# Patient Record
Sex: FEMALE | Race: WHITE | NOT HISPANIC OR LATINO | Employment: OTHER | URBAN - METROPOLITAN AREA
[De-identification: names, ages, dates, MRNs, and addresses within clinical notes are randomized per-mention and may not be internally consistent; named-entity substitution may affect disease eponyms.]

---

## 2019-08-14 ENCOUNTER — HISTORICAL (OUTPATIENT)
Dept: ADMINISTRATIVE | Facility: HOSPITAL | Age: 74
End: 2019-08-14

## 2019-08-15 ENCOUNTER — HISTORICAL (OUTPATIENT)
Dept: ADMINISTRATIVE | Facility: HOSPITAL | Age: 74
End: 2019-08-15

## 2019-09-25 ENCOUNTER — HISTORICAL (OUTPATIENT)
Dept: ADMINISTRATIVE | Facility: HOSPITAL | Age: 74
End: 2019-09-25

## 2019-12-17 ENCOUNTER — HISTORICAL (OUTPATIENT)
Dept: RADIOLOGY | Facility: HOSPITAL | Age: 74
End: 2019-12-17

## 2020-02-03 ENCOUNTER — HISTORICAL (OUTPATIENT)
Dept: ADMINISTRATIVE | Facility: HOSPITAL | Age: 75
End: 2020-02-03

## 2020-02-03 LAB
ALBUMIN SERPL-MCNC: 3.7 GM/DL (ref 3.4–5)
ALBUMIN/GLOB SERPL: 1.2 {RATIO}
ALP SERPL-CCNC: 61 UNIT/L (ref 38–126)
ALT SERPL-CCNC: 26 UNIT/L (ref 12–78)
AST SERPL-CCNC: 19 UNIT/L (ref 15–37)
BILIRUB SERPL-MCNC: 0.7 MG/DL (ref 0.2–1)
BILIRUBIN DIRECT+TOT PNL SERPL-MCNC: 0.2 MG/DL (ref 0–0.2)
BILIRUBIN DIRECT+TOT PNL SERPL-MCNC: 0.5 MG/DL (ref 0–0.8)
BUN SERPL-MCNC: 17 MG/DL (ref 7–18)
CALCIUM SERPL-MCNC: 9.2 MG/DL (ref 8.5–10.1)
CHLORIDE SERPL-SCNC: 106 MMOL/L (ref 98–107)
CO2 SERPL-SCNC: 30 MMOL/L (ref 21–32)
CREAT SERPL-MCNC: 0.92 MG/DL (ref 0.55–1.02)
ERYTHROCYTE [DISTWIDTH] IN BLOOD BY AUTOMATED COUNT: 13.7 % (ref 11.5–17)
GLOBULIN SER-MCNC: 3.1 GM/DL (ref 2.4–3.5)
GLUCOSE SERPL-MCNC: 118 MG/DL (ref 74–106)
HCT VFR BLD AUTO: 39.1 % (ref 37–47)
HGB BLD-MCNC: 12 GM/DL (ref 12–16)
MCH RBC QN AUTO: 26.3 PG (ref 27–31)
MCHC RBC AUTO-ENTMCNC: 30.7 GM/DL (ref 33–36)
MCV RBC AUTO: 85.6 FL (ref 80–94)
PLATELET # BLD AUTO: 206 X10(3)/MCL (ref 130–400)
PMV BLD AUTO: 9.9 FL (ref 9.4–12.4)
POTASSIUM SERPL-SCNC: 5.1 MMOL/L (ref 3.5–5.1)
PROT SERPL-MCNC: 6.8 GM/DL (ref 6.4–8.2)
RBC # BLD AUTO: 4.57 X10(6)/MCL (ref 4.2–5.4)
SODIUM SERPL-SCNC: 138 MMOL/L (ref 136–145)
WBC # SPEC AUTO: 6.4 X10(3)/MCL (ref 4.5–11.5)

## 2020-02-04 ENCOUNTER — HISTORICAL (OUTPATIENT)
Dept: PREADMISSION TESTING | Facility: HOSPITAL | Age: 75
End: 2020-02-04

## 2020-02-13 ENCOUNTER — HISTORICAL (OUTPATIENT)
Dept: SURGERY | Facility: HOSPITAL | Age: 75
End: 2020-02-13

## 2020-05-20 ENCOUNTER — HISTORICAL (OUTPATIENT)
Dept: RADIOLOGY | Facility: HOSPITAL | Age: 75
End: 2020-05-20

## 2021-03-03 ENCOUNTER — HISTORICAL (OUTPATIENT)
Dept: RADIOLOGY | Facility: HOSPITAL | Age: 76
End: 2021-03-03

## 2021-03-08 ENCOUNTER — HISTORICAL (OUTPATIENT)
Dept: ADMINISTRATIVE | Facility: HOSPITAL | Age: 76
End: 2021-03-08

## 2021-03-11 ENCOUNTER — HISTORICAL (OUTPATIENT)
Dept: RESPIRATORY THERAPY | Facility: HOSPITAL | Age: 76
End: 2021-03-11

## 2021-07-20 ENCOUNTER — HISTORICAL (OUTPATIENT)
Dept: RADIOLOGY | Facility: HOSPITAL | Age: 76
End: 2021-07-20

## 2021-07-20 LAB — POC CREATININE: 1 MG/DL (ref 0.6–1.3)

## 2021-07-22 ENCOUNTER — HISTORICAL (OUTPATIENT)
Dept: ADMINISTRATIVE | Facility: HOSPITAL | Age: 76
End: 2021-07-22

## 2021-07-22 LAB
ABS NEUT (OLG): 4.3 X10(3)/MCL (ref 2.1–9.2)
ALBUMIN SERPL-MCNC: 3.4 GM/DL (ref 3.4–4.8)
ALBUMIN/GLOB SERPL: 1 RATIO (ref 1.1–2)
ALP SERPL-CCNC: 75 UNIT/L (ref 40–150)
ALT SERPL-CCNC: 20 UNIT/L (ref 0–55)
AST SERPL-CCNC: 25 UNIT/L (ref 5–34)
BASOPHILS # BLD AUTO: 0 X10(3)/MCL (ref 0–0.2)
BASOPHILS NFR BLD AUTO: 0.7 %
BILIRUB SERPL-MCNC: 0.6 MG/DL
BILIRUBIN DIRECT+TOT PNL SERPL-MCNC: 0.2 MG/DL (ref 0–0.5)
BILIRUBIN DIRECT+TOT PNL SERPL-MCNC: 0.4 MG/DL (ref 0–0.8)
BUN SERPL-MCNC: 12.8 MG/DL (ref 9.8–20.1)
CALCIUM SERPL-MCNC: 8.7 MG/DL (ref 8.4–10.2)
CHLORIDE SERPL-SCNC: 106 MMOL/L (ref 98–107)
CO2 SERPL-SCNC: 29 MMOL/L (ref 23–31)
CREAT SERPL-MCNC: 1.04 MG/DL (ref 0.55–1.02)
EOSINOPHIL # BLD AUTO: 0.2 X10(3)/MCL (ref 0–0.9)
EOSINOPHIL NFR BLD AUTO: 2.7 %
ERYTHROCYTE [DISTWIDTH] IN BLOOD BY AUTOMATED COUNT: 13.9 % (ref 11.5–17)
GLOBULIN SER-MCNC: 3.5 GM/DL (ref 2.4–3.5)
GLUCOSE SERPL-MCNC: 103 MG/DL (ref 82–115)
HCT VFR BLD AUTO: 38.9 % (ref 37–47)
HGB BLD-MCNC: 12.2 GM/DL (ref 12–16)
LYMPHOCYTES # BLD AUTO: 2.1 X10(3)/MCL (ref 0.6–4.6)
LYMPHOCYTES NFR BLD AUTO: 28.3 %
MCH RBC QN AUTO: 26.1 PG (ref 27–31)
MCHC RBC AUTO-ENTMCNC: 31.4 GM/DL (ref 33–36)
MCV RBC AUTO: 83.1 FL (ref 80–94)
MONOCYTES # BLD AUTO: 0.7 X10(3)/MCL (ref 0.1–1.3)
MONOCYTES NFR BLD AUTO: 9.4 %
NEUTROPHILS # BLD AUTO: 4.3 X10(3)/MCL (ref 2.1–9.2)
NEUTROPHILS NFR BLD AUTO: 58.6 %
PLATELET # BLD AUTO: 223 X10(3)/MCL (ref 130–400)
PMV BLD AUTO: 10 FL (ref 9.4–12.4)
POTASSIUM SERPL-SCNC: 4.8 MMOL/L (ref 3.5–5.1)
PROT SERPL-MCNC: 6.9 GM/DL (ref 5.8–7.6)
RBC # BLD AUTO: 4.68 X10(6)/MCL (ref 4.2–5.4)
SODIUM SERPL-SCNC: 143 MMOL/L (ref 136–145)
WBC # SPEC AUTO: 7.3 X10(3)/MCL (ref 4.5–11.5)

## 2021-10-26 ENCOUNTER — HISTORICAL (OUTPATIENT)
Dept: ADMINISTRATIVE | Facility: HOSPITAL | Age: 76
End: 2021-10-26

## 2021-10-26 LAB
ABS NEUT (OLG): 5.83 X10(3)/MCL (ref 2.1–9.2)
ALBUMIN SERPL-MCNC: 3.7 GM/DL (ref 3.4–4.8)
ALBUMIN/GLOB SERPL: 1.2 RATIO (ref 1.1–2)
ALP SERPL-CCNC: 76 UNIT/L (ref 40–150)
ALT SERPL-CCNC: 19 UNIT/L (ref 0–55)
AST SERPL-CCNC: 25 UNIT/L (ref 5–34)
BASOPHILS # BLD AUTO: 0 X10(3)/MCL (ref 0–0.2)
BASOPHILS NFR BLD AUTO: 0.5 %
BILIRUB SERPL-MCNC: 0.7 MG/DL
BILIRUBIN DIRECT+TOT PNL SERPL-MCNC: 0.3 MG/DL (ref 0–0.5)
BILIRUBIN DIRECT+TOT PNL SERPL-MCNC: 0.4 MG/DL (ref 0–0.8)
BUN SERPL-MCNC: 14.6 MG/DL (ref 9.8–20.1)
CALCIUM SERPL-MCNC: 9.3 MG/DL (ref 8.7–10.5)
CHLORIDE SERPL-SCNC: 106 MMOL/L (ref 98–107)
CO2 SERPL-SCNC: 29 MMOL/L (ref 23–31)
CREAT SERPL-MCNC: 0.89 MG/DL (ref 0.55–1.02)
EOSINOPHIL # BLD AUTO: 0.2 X10(3)/MCL (ref 0–0.9)
EOSINOPHIL NFR BLD AUTO: 2 %
ERYTHROCYTE [DISTWIDTH] IN BLOOD BY AUTOMATED COUNT: 13.5 % (ref 11.5–17)
GLOBULIN SER-MCNC: 3.2 GM/DL (ref 2.4–3.5)
GLUCOSE SERPL-MCNC: 90 MG/DL (ref 82–115)
HCT VFR BLD AUTO: 41.7 % (ref 37–47)
HGB BLD-MCNC: 12.8 GM/DL (ref 12–16)
LYMPHOCYTES # BLD AUTO: 2.2 X10(3)/MCL (ref 0.6–4.6)
LYMPHOCYTES NFR BLD AUTO: 23.9 %
MCH RBC QN AUTO: 26 PG (ref 27–31)
MCHC RBC AUTO-ENTMCNC: 30.7 GM/DL (ref 33–36)
MCV RBC AUTO: 84.6 FL (ref 80–94)
MONOCYTES # BLD AUTO: 0.9 X10(3)/MCL (ref 0.1–1.3)
MONOCYTES NFR BLD AUTO: 9.9 %
NEUTROPHILS # BLD AUTO: 5.8 X10(3)/MCL (ref 2.1–9.2)
NEUTROPHILS NFR BLD AUTO: 63.2 %
PLATELET # BLD AUTO: 214 X10(3)/MCL (ref 130–400)
PMV BLD AUTO: 9.2 FL (ref 9.4–12.4)
POTASSIUM SERPL-SCNC: 4.4 MMOL/L (ref 3.5–5.1)
PROT SERPL-MCNC: 6.9 GM/DL (ref 5.8–7.6)
RBC # BLD AUTO: 4.93 X10(6)/MCL (ref 4.2–5.4)
SODIUM SERPL-SCNC: 144 MMOL/L (ref 136–145)
WBC # SPEC AUTO: 9.2 X10(3)/MCL (ref 4.5–11.5)

## 2022-01-27 ENCOUNTER — HISTORICAL (OUTPATIENT)
Dept: HEMATOLOGY/ONCOLOGY | Facility: CLINIC | Age: 77
End: 2022-01-27

## 2022-01-27 LAB — POC CREATININE: 1 (ref 0.6–1.3)

## 2022-02-01 ENCOUNTER — HISTORICAL (OUTPATIENT)
Dept: ADMINISTRATIVE | Facility: HOSPITAL | Age: 77
End: 2022-02-01

## 2022-02-01 LAB
ABS NEUT (OLG): 5.52 (ref 2.1–9.2)
ALBUMIN SERPL-MCNC: 3.7 G/DL (ref 3.4–4.8)
ALBUMIN/GLOB SERPL: 1.2 {RATIO} (ref 1.1–2)
ALP SERPL-CCNC: 76 U/L (ref 40–150)
ALT SERPL-CCNC: 24 U/L (ref 0–55)
AST SERPL-CCNC: 23 U/L (ref 5–34)
BASOPHILS # BLD AUTO: 0 10*3/UL (ref 0–0.2)
BASOPHILS NFR BLD AUTO: 0.5 %
BILIRUB SERPL-MCNC: 0.7 MG/DL
BILIRUBIN DIRECT+TOT PNL SERPL-MCNC: 0.3 (ref 0–0.5)
BILIRUBIN DIRECT+TOT PNL SERPL-MCNC: 0.4 (ref 0–0.8)
BUN SERPL-MCNC: 15.7 MG/DL (ref 9.8–20.1)
CALCIUM SERPL-MCNC: 9.4 MG/DL (ref 8.7–10.5)
CHLORIDE SERPL-SCNC: 106 MMOL/L (ref 98–107)
CO2 SERPL-SCNC: 28 MMOL/L (ref 23–31)
CREAT SERPL-MCNC: 0.99 MG/DL (ref 0.55–1.02)
EOSINOPHIL # BLD AUTO: 0.1 10*3/UL (ref 0–0.9)
EOSINOPHIL NFR BLD AUTO: 1.5 %
ERYTHROCYTE [DISTWIDTH] IN BLOOD BY AUTOMATED COUNT: 13.2 % (ref 11.5–17)
GLOBULIN SER-MCNC: 3.2 G/DL (ref 2.4–3.5)
GLUCOSE SERPL-MCNC: 175 MG/DL (ref 82–115)
HCT VFR BLD AUTO: 41.3 % (ref 37–47)
HEMOLYSIS INTERF INDEX SERPL-ACNC: 6
HGB BLD-MCNC: 12.8 G/DL (ref 12–16)
ICTERIC INTERF INDEX SERPL-ACNC: 1
LDH SERPL-CCNC: 177 U/L (ref 140–271)
LIPEMIC INTERF INDEX SERPL-ACNC: 4
LYMPHOCYTES # BLD AUTO: 1.7 10*3/UL (ref 0.6–4.6)
LYMPHOCYTES NFR BLD AUTO: 21.6 %
MANUAL DIFF? (OHS): NO
MCH RBC QN AUTO: 26.1 PG (ref 27–31)
MCHC RBC AUTO-ENTMCNC: 31 G/DL (ref 33–36)
MCV RBC AUTO: 84.1 FL (ref 80–94)
MONOCYTES # BLD AUTO: 0.5 10*3/UL (ref 0.1–1.3)
MONOCYTES NFR BLD AUTO: 6.7 %
NEUTROPHILS # BLD AUTO: 5.5 10*3/UL (ref 2.1–9.2)
NEUTROPHILS NFR BLD AUTO: 69.3 %
PLATELET # BLD AUTO: 239 10*3/UL (ref 130–400)
PMV BLD AUTO: 9.8 FL (ref 9.4–12.4)
POTASSIUM SERPL-SCNC: 4.1 MMOL/L (ref 3.5–5.1)
PROT SERPL-MCNC: 6.9 G/DL (ref 5.8–7.6)
RBC # BLD AUTO: 4.91 10*6/UL (ref 4.2–5.4)
SODIUM SERPL-SCNC: 141 MMOL/L (ref 136–145)
WBC # SPEC AUTO: 8 10*3/UL (ref 4.5–11.5)

## 2022-04-11 ENCOUNTER — HISTORICAL (OUTPATIENT)
Dept: ADMINISTRATIVE | Facility: HOSPITAL | Age: 77
End: 2022-04-11
Payer: MEDICARE

## 2022-04-27 VITALS
SYSTOLIC BLOOD PRESSURE: 117 MMHG | BODY MASS INDEX: 29.82 KG/M2 | HEIGHT: 62 IN | WEIGHT: 162.06 LBS | OXYGEN SATURATION: 98 % | DIASTOLIC BLOOD PRESSURE: 78 MMHG

## 2022-04-27 DIAGNOSIS — C34.31 MALIGNANT NEOPLASM OF LOWER LOBE, RIGHT BRONCHUS OR LUNG: Primary | ICD-10-CM

## 2022-05-26 ENCOUNTER — OFFICE VISIT (OUTPATIENT)
Dept: HEMATOLOGY/ONCOLOGY | Facility: CLINIC | Age: 77
End: 2022-05-26
Payer: MEDICARE

## 2022-05-26 ENCOUNTER — LAB VISIT (OUTPATIENT)
Dept: LAB | Facility: HOSPITAL | Age: 77
End: 2022-05-26
Payer: MEDICARE

## 2022-05-26 VITALS
DIASTOLIC BLOOD PRESSURE: 80 MMHG | HEART RATE: 69 BPM | SYSTOLIC BLOOD PRESSURE: 134 MMHG | TEMPERATURE: 98 F | OXYGEN SATURATION: 97 % | BODY MASS INDEX: 30.55 KG/M2 | HEIGHT: 62 IN | WEIGHT: 166 LBS

## 2022-05-26 DIAGNOSIS — C34.90 MALIGNANT NEOPLASM OF UNSPECIFIED PART OF UNSPECIFIED BRONCHUS OR LUNG: ICD-10-CM

## 2022-05-26 DIAGNOSIS — C34.31 MALIGNANT NEOPLASM OF LOWER LOBE, RIGHT BRONCHUS OR LUNG: ICD-10-CM

## 2022-05-26 DIAGNOSIS — C34.31 SQUAMOUS CELL CARCINOMA OF BRONCHUS IN RIGHT LOWER LOBE: Primary | ICD-10-CM

## 2022-05-26 LAB
ALBUMIN SERPL-MCNC: 4 GM/DL (ref 3.4–4.8)
ALBUMIN/GLOB SERPL: 1.2 RATIO (ref 1.1–2)
ALP SERPL-CCNC: 75 UNIT/L (ref 40–150)
ALT SERPL-CCNC: 23 UNIT/L (ref 0–55)
AST SERPL-CCNC: 27 UNIT/L (ref 5–34)
BASOPHILS # BLD AUTO: 0.05 X10(3)/MCL (ref 0–0.2)
BASOPHILS NFR BLD AUTO: 0.6 %
BILIRUBIN DIRECT+TOT PNL SERPL-MCNC: 0.8 MG/DL
BUN SERPL-MCNC: 17 MG/DL (ref 9.8–20.1)
CALCIUM SERPL-MCNC: 9.8 MG/DL (ref 8.4–10.2)
CHLORIDE SERPL-SCNC: 107 MMOL/L (ref 98–107)
CO2 SERPL-SCNC: 26 MMOL/L (ref 23–31)
CREAT SERPL-MCNC: 1.13 MG/DL (ref 0.55–1.02)
EOSINOPHIL # BLD AUTO: 0.23 X10(3)/MCL (ref 0–0.9)
EOSINOPHIL NFR BLD AUTO: 2.6 %
ERYTHROCYTE [DISTWIDTH] IN BLOOD BY AUTOMATED COUNT: 13.3 % (ref 11.5–17)
GLOBULIN SER-MCNC: 3.4 GM/DL (ref 2.4–3.5)
GLUCOSE SERPL-MCNC: 71 MG/DL (ref 82–115)
HCT VFR BLD AUTO: 42.5 % (ref 37–47)
HGB BLD-MCNC: 13.1 GM/DL (ref 12–16)
IMM GRANULOCYTES # BLD AUTO: 0.03 X10(3)/MCL (ref 0–0.02)
IMM GRANULOCYTES NFR BLD AUTO: 0.3 % (ref 0–0.43)
LYMPHOCYTES # BLD AUTO: 2.13 X10(3)/MCL (ref 0.6–4.6)
LYMPHOCYTES NFR BLD AUTO: 24.2 %
MCH RBC QN AUTO: 26 PG (ref 27–31)
MCHC RBC AUTO-ENTMCNC: 30.8 MG/DL (ref 33–36)
MCV RBC AUTO: 84.3 FL (ref 80–94)
MONOCYTES # BLD AUTO: 0.81 X10(3)/MCL (ref 0.1–1.3)
MONOCYTES NFR BLD AUTO: 9.2 %
NEUTROPHILS # BLD AUTO: 5.5 X10(3)/MCL (ref 2.1–9.2)
NEUTROPHILS NFR BLD AUTO: 63.1 %
PLATELET # BLD AUTO: 248 X10(3)/MCL (ref 130–400)
PMV BLD AUTO: 9.8 FL (ref 9.4–12.4)
POTASSIUM SERPL-SCNC: 5 MMOL/L (ref 3.5–5.1)
PROT SERPL-MCNC: 7.4 GM/DL (ref 5.8–7.6)
RBC # BLD AUTO: 5.04 X10(6)/MCL (ref 4.2–5.4)
SODIUM SERPL-SCNC: 146 MMOL/L (ref 136–145)
WBC # SPEC AUTO: 8.8 X10(3)/MCL (ref 4.5–11.5)

## 2022-05-26 PROCEDURE — 1160F RVW MEDS BY RX/DR IN RCRD: CPT | Mod: CPTII,S$GLB,, | Performed by: NURSE PRACTITIONER

## 2022-05-26 PROCEDURE — 1101F PT FALLS ASSESS-DOCD LE1/YR: CPT | Mod: CPTII,S$GLB,, | Performed by: NURSE PRACTITIONER

## 2022-05-26 PROCEDURE — 1159F PR MEDICATION LIST DOCUMENTED IN MEDICAL RECORD: ICD-10-PCS | Mod: CPTII,S$GLB,, | Performed by: NURSE PRACTITIONER

## 2022-05-26 PROCEDURE — 36415 COLL VENOUS BLD VENIPUNCTURE: CPT

## 2022-05-26 PROCEDURE — 99999 PR PBB SHADOW E&M-EST. PATIENT-LVL IV: CPT | Mod: PBBFAC,,, | Performed by: NURSE PRACTITIONER

## 2022-05-26 PROCEDURE — 1126F AMNT PAIN NOTED NONE PRSNT: CPT | Mod: CPTII,S$GLB,, | Performed by: NURSE PRACTITIONER

## 2022-05-26 PROCEDURE — 99999 PR PBB SHADOW E&M-EST. PATIENT-LVL IV: ICD-10-PCS | Mod: PBBFAC,,, | Performed by: NURSE PRACTITIONER

## 2022-05-26 PROCEDURE — 1101F PR PT FALLS ASSESS DOC 0-1 FALLS W/OUT INJ PAST YR: ICD-10-PCS | Mod: CPTII,S$GLB,, | Performed by: NURSE PRACTITIONER

## 2022-05-26 PROCEDURE — 3075F PR MOST RECENT SYSTOLIC BLOOD PRESS GE 130-139MM HG: ICD-10-PCS | Mod: CPTII,S$GLB,, | Performed by: NURSE PRACTITIONER

## 2022-05-26 PROCEDURE — 1126F PR PAIN SEVERITY QUANTIFIED, NO PAIN PRESENT: ICD-10-PCS | Mod: CPTII,S$GLB,, | Performed by: NURSE PRACTITIONER

## 2022-05-26 PROCEDURE — 3288F FALL RISK ASSESSMENT DOCD: CPT | Mod: CPTII,S$GLB,, | Performed by: NURSE PRACTITIONER

## 2022-05-26 PROCEDURE — 3288F PR FALLS RISK ASSESSMENT DOCUMENTED: ICD-10-PCS | Mod: CPTII,S$GLB,, | Performed by: NURSE PRACTITIONER

## 2022-05-26 PROCEDURE — 1159F MED LIST DOCD IN RCRD: CPT | Mod: CPTII,S$GLB,, | Performed by: NURSE PRACTITIONER

## 2022-05-26 PROCEDURE — 99213 PR OFFICE/OUTPT VISIT, EST, LEVL III, 20-29 MIN: ICD-10-PCS | Mod: S$GLB,,, | Performed by: NURSE PRACTITIONER

## 2022-05-26 PROCEDURE — 80053 COMPREHEN METABOLIC PANEL: CPT

## 2022-05-26 PROCEDURE — 85025 COMPLETE CBC W/AUTO DIFF WBC: CPT

## 2022-05-26 PROCEDURE — 1160F PR REVIEW ALL MEDS BY PRESCRIBER/CLIN PHARMACIST DOCUMENTED: ICD-10-PCS | Mod: CPTII,S$GLB,, | Performed by: NURSE PRACTITIONER

## 2022-05-26 PROCEDURE — 99213 OFFICE O/P EST LOW 20 MIN: CPT | Mod: S$GLB,,, | Performed by: NURSE PRACTITIONER

## 2022-05-26 PROCEDURE — 3079F DIAST BP 80-89 MM HG: CPT | Mod: CPTII,S$GLB,, | Performed by: NURSE PRACTITIONER

## 2022-05-26 PROCEDURE — 3079F PR MOST RECENT DIASTOLIC BLOOD PRESSURE 80-89 MM HG: ICD-10-PCS | Mod: CPTII,S$GLB,, | Performed by: NURSE PRACTITIONER

## 2022-05-26 PROCEDURE — 3075F SYST BP GE 130 - 139MM HG: CPT | Mod: CPTII,S$GLB,, | Performed by: NURSE PRACTITIONER

## 2022-05-26 RX ORDER — GLIPIZIDE 10 MG/1
TABLET ORAL
COMMUNITY
Start: 2022-03-30

## 2022-05-26 RX ORDER — LANCETS 33 GAUGE
EACH MISCELLANEOUS
COMMUNITY
Start: 2022-04-10

## 2022-05-26 RX ORDER — MIRTAZAPINE 15 MG/1
TABLET, FILM COATED ORAL
COMMUNITY
Start: 2022-04-10

## 2022-05-26 RX ORDER — CALCIUM CITRATE/VITAMIN D3 200MG-6.25
TABLET ORAL
COMMUNITY
Start: 2022-04-10

## 2022-05-26 RX ORDER — ENALAPRIL MALEATE 2.5 MG/1
TABLET ORAL
COMMUNITY
Start: 2022-03-30

## 2022-05-26 RX ORDER — BLOOD-GLUCOSE METER
EACH MISCELLANEOUS
COMMUNITY
Start: 2022-01-26

## 2022-05-26 RX ORDER — EZETIMIBE AND SIMVASTATIN 10; 80 MG/1; MG/1
TABLET ORAL
COMMUNITY
Start: 2022-03-30

## 2022-05-26 RX ORDER — METFORMIN HYDROCHLORIDE 1000 MG/1
TABLET ORAL
COMMUNITY
Start: 2022-03-30

## 2022-05-26 NOTE — PROGRESS NOTES
Heme/Onc Progress Note    PATIENT: Amanda Colmenares  MRN: [id number]  DATE: 5/27/2022  Chief Complaint: No Concerns today        Oncology History   Initial Consultation: 4/20/2021  Referring Physician: Dr Darling  Other Physicians: Dr Zayas  Code Status: Not addressed    Diagnosis/Problem list:   Stage IA2 [id number] RLL NSCLC. Dx 4/5/21    Present Treatment:  Observation    Treatment history:    4/5/2021 underwent right lower lobectomy with mediastinal lymph node    Imaging studies:  11/25/2019 Ct Chest:  RLL superior segment 7 mm groundglass density is identified. A RLL12 mm x 7.9 mm mass is noted with marginal spiculation.  12/17/2019 PET/CT: Right lower lobe 8 mm nodule is stable in size and demonstrates only low-grade FDG activity. A follow-up chest CT in 3-6 months would be reasonable to monitor.  5/28/2020 CT scan chest: showed a right lower lobe 1 cm spiculated nodule, previously 0.8 cm   3/3/2021 Ct Chest: enlarging now 2 cm right lower lobe pulmonary nodule concerning for malignancy.    Plan of care: Surveillance    Clinical History:   77 year old female kindly referred for lung cancer.  Patient with a 50-pack-year history (used to smoke 3 ppd) but stopped smoking about 30 years ago. She was follow closely for pulmonary nodule.  On 5/28/2020 CT scan of the chest showed a right lower lobe 1 cm spiculated nodule, previously 0.8 cm on 12/17/2019.  CT was repeated on 3/3/2021 that revealed an enlarging now 2 cm right lower lobe pulmonary nodule concerning for malignancy.    On 4/5/2021 underwent right lower lobectomy with mediastinal lymph node dissection by Dr. Darling  Pathology as follows:  (1)  LYMPH NODE, 9R, LYMPHADENECTOMY: THREE LYMPH NODE NEGATIVE FOR METASTATIC CARCINOMA (0/3).  (2)  LYMPH NODE, 11R, LYMPHADENECTOMY: ONE LYMPH NODE NEGATIVE FOR METASTATIC CARCINOMA (0/1).  (3)  LYMPH NODE, 4R, LYMPHADENECTOMY: ONE LYMPH NODE NEGATIVE FOR METASTATIC CARCINOMA (0/1).  (4)  LUNG, RIGHT LOWER LOBECTOMY:  INVASIVE MODERATELY TO POORLY DIFFERENTIATED SQUAMOUS CELL CARCINOMA, KERATINIZING.   - TUMOR SIZE:  1.9 CM.   - MARGINS NEGATIVE.   - LYMPHOVASCULAR INVASION NOT IDENTIFIED.  TWO LYMPH NODES NEGATIVE FOR METASTATIC CARCINOMA (0/2).    Patient is here today with her .  She denies any fever, chills, sweats.  No chest pain or shortness of breath.  She still have some tenderness in the area of surgery, that is well-healed.  No headaches or changes in vision.  No changes in bowel habits.    Patient have a sister that  of pancreatic cancer.  Brother and father both had cancer that spread to the bones.  No family history of lung cancer or other malignancies.              Review of Systems   All other systems reviewed and are negative.          Interval History, 22: Patient presents today for 3 month f/u. She is doing well. She has no complaints. Denies fever, chills, sweats, weight loss, pain. Bowels move normally. Denies any bleeding.     Objective:     Vitals:    22 1047   BP: 134/80   Pulse: 69   Temp: 98 °F (36.7 °C)         Physical Exam  Constitutional:       Appearance: Normal appearance.   HENT:      Nose: Nose normal.      Mouth/Throat:      Mouth: Mucous membranes are moist.      Pharynx: Oropharynx is clear.   Cardiovascular:      Rate and Rhythm: Normal rate and regular rhythm.      Pulses: Normal pulses.      Heart sounds: Normal heart sounds.   Pulmonary:      Effort: Pulmonary effort is normal.      Breath sounds: Normal breath sounds.   Abdominal:      General: Bowel sounds are normal.      Palpations: Abdomen is soft.   Musculoskeletal:      Cervical back: Normal range of motion.      Right lower leg: No edema.      Left lower leg: No edema.   Skin:     General: Skin is warm and dry.   Neurological:      Mental Status: She is alert.   Psychiatric:         Mood and Affect: Mood normal.         Behavior: Behavior normal.         Assessment:     Stage IA2 [id number] RLL NSCLC Dx  4/5/2021   --Mod-poorly differentiated squamous cell carcinoma   --Tumor 1.9 cm, no lymphovascular invasion identify, visceral pleural invasion not identify, margins clear   --0/7 LN pos      Problem List Items Addressed This Visit        Oncology    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 - Primary    Relevant Orders    CBC Auto Differential    Comprehensive Metabolic Panel      Other Visit Diagnoses     Malignant neoplasm of unspecified part of unspecified bronchus or lung        Relevant Orders    CT Chest With Contrast            Plan:        NCCN guidelines:   --for stage I (G8xptV3)--> margins negative (R0)--> observation (NSCL-4)   --for surveillance after completion of definitive therapy--> H&P and chest CTq6 months x 2-3 yrs, then H&P + low-dose noncontrast enhanced CT annually (NSCL-16)  Patient diagnosed with early stage (1A2) non-small cell lung carcinoma, margins negative in April 2021.  CT scan done on 7/20/21- 3 months after surgery- [facility]  CT scan of the chest with contrast on 1/24/22 with no evidence of recurrence or metastatic disease.   RTC 3 months or earlier if needed with CBC, CMP and CT CHEST            Med Onc Chart Routing      Follow up with physician 3 months.   Follow up with CHRISTINE    Labs CBC and CMP   Lab interval:     Imaging Other   CT Chest   Pharmacy appointment    Other referrals

## 2022-08-18 ENCOUNTER — HOSPITAL ENCOUNTER (OUTPATIENT)
Dept: RADIOLOGY | Facility: HOSPITAL | Age: 77
Discharge: HOME | End: 2022-08-18
Attending: NURSE PRACTITIONER
Payer: MEDICARE

## 2022-08-18 DIAGNOSIS — C34.90 MALIGNANT NEOPLASM OF UNSPECIFIED PART OF UNSPECIFIED BRONCHUS OR LUNG: ICD-10-CM

## 2022-08-18 LAB
CREAT SERPL-MCNC: 1 MG/DL (ref 0.5–1.4)
SAMPLE: NORMAL

## 2022-08-18 PROCEDURE — 25500020 PHARM REV CODE 255: Performed by: NURSE PRACTITIONER

## 2022-08-18 PROCEDURE — 71260 CT THORAX DX C+: CPT | Mod: TC

## 2022-08-18 RX ADMIN — IOPAMIDOL 100 ML: 755 INJECTION, SOLUTION INTRAVENOUS at 01:08

## 2022-08-31 ENCOUNTER — OFFICE VISIT (OUTPATIENT)
Dept: HEMATOLOGY/ONCOLOGY | Facility: CLINIC | Age: 77
End: 2022-08-31
Payer: MEDICARE

## 2022-08-31 ENCOUNTER — LAB VISIT (OUTPATIENT)
Dept: LAB | Facility: HOSPITAL | Age: 77
End: 2022-08-31
Attending: INTERNAL MEDICINE
Payer: MEDICARE

## 2022-08-31 VITALS
DIASTOLIC BLOOD PRESSURE: 80 MMHG | TEMPERATURE: 98 F | HEIGHT: 62 IN | SYSTOLIC BLOOD PRESSURE: 138 MMHG | WEIGHT: 167 LBS | BODY MASS INDEX: 30.73 KG/M2 | OXYGEN SATURATION: 99 % | HEART RATE: 65 BPM

## 2022-08-31 DIAGNOSIS — C34.31 SQUAMOUS CELL CARCINOMA OF BRONCHUS IN RIGHT LOWER LOBE: Primary | ICD-10-CM

## 2022-08-31 DIAGNOSIS — C34.31 SQUAMOUS CELL CARCINOMA OF BRONCHUS IN RIGHT LOWER LOBE: ICD-10-CM

## 2022-08-31 LAB
ALBUMIN SERPL-MCNC: 3.7 GM/DL (ref 3.4–4.8)
ALBUMIN/GLOB SERPL: 1.2 RATIO (ref 1.1–2)
ALP SERPL-CCNC: 63 UNIT/L (ref 40–150)
ALT SERPL-CCNC: 21 UNIT/L (ref 0–55)
AST SERPL-CCNC: 27 UNIT/L (ref 5–34)
BASOPHILS # BLD AUTO: 0.04 X10(3)/MCL (ref 0–0.2)
BASOPHILS NFR BLD AUTO: 0.5 %
BILIRUBIN DIRECT+TOT PNL SERPL-MCNC: 0.9 MG/DL
BUN SERPL-MCNC: 16.7 MG/DL (ref 9.8–20.1)
CALCIUM SERPL-MCNC: 9.3 MG/DL (ref 8.4–10.2)
CHLORIDE SERPL-SCNC: 106 MMOL/L (ref 98–107)
CO2 SERPL-SCNC: 30 MMOL/L (ref 23–31)
CREAT SERPL-MCNC: 1.03 MG/DL (ref 0.55–1.02)
EOSINOPHIL # BLD AUTO: 0.16 X10(3)/MCL (ref 0–0.9)
EOSINOPHIL NFR BLD AUTO: 2.1 %
ERYTHROCYTE [DISTWIDTH] IN BLOOD BY AUTOMATED COUNT: 13.1 % (ref 11.5–17)
GFR SERPLBLD CREATININE-BSD FMLA CKD-EPI: 56 MLS/MIN/1.73/M2
GLOBULIN SER-MCNC: 3.2 GM/DL (ref 2.4–3.5)
GLUCOSE SERPL-MCNC: 116 MG/DL (ref 82–115)
HCT VFR BLD AUTO: 41 % (ref 37–47)
HGB BLD-MCNC: 12.6 GM/DL (ref 12–16)
IMM GRANULOCYTES # BLD AUTO: 0.04 X10(3)/MCL (ref 0–0.04)
IMM GRANULOCYTES NFR BLD AUTO: 0.5 %
LYMPHOCYTES # BLD AUTO: 1.71 X10(3)/MCL (ref 0.6–4.6)
LYMPHOCYTES NFR BLD AUTO: 22.4 %
MCH RBC QN AUTO: 26.2 PG (ref 27–31)
MCHC RBC AUTO-ENTMCNC: 30.7 MG/DL (ref 33–36)
MCV RBC AUTO: 85.2 FL (ref 80–94)
MONOCYTES # BLD AUTO: 0.69 X10(3)/MCL (ref 0.1–1.3)
MONOCYTES NFR BLD AUTO: 9 %
NEUTROPHILS # BLD AUTO: 5 X10(3)/MCL (ref 2.1–9.2)
NEUTROPHILS NFR BLD AUTO: 65.5 %
PLATELET # BLD AUTO: 206 X10(3)/MCL (ref 130–400)
PMV BLD AUTO: 9.6 FL (ref 7.4–10.4)
POTASSIUM SERPL-SCNC: 4.2 MMOL/L (ref 3.5–5.1)
PROT SERPL-MCNC: 6.9 GM/DL (ref 5.8–7.6)
RBC # BLD AUTO: 4.81 X10(6)/MCL (ref 4.2–5.4)
SODIUM SERPL-SCNC: 142 MMOL/L (ref 136–145)
WBC # SPEC AUTO: 7.6 X10(3)/MCL (ref 4.5–11.5)

## 2022-08-31 PROCEDURE — 1159F MED LIST DOCD IN RCRD: CPT | Mod: CPTII,S$GLB,, | Performed by: INTERNAL MEDICINE

## 2022-08-31 PROCEDURE — 1160F PR REVIEW ALL MEDS BY PRESCRIBER/CLIN PHARMACIST DOCUMENTED: ICD-10-PCS | Mod: CPTII,S$GLB,, | Performed by: INTERNAL MEDICINE

## 2022-08-31 PROCEDURE — 85025 COMPLETE CBC W/AUTO DIFF WBC: CPT

## 2022-08-31 PROCEDURE — 99214 PR OFFICE/OUTPT VISIT, EST, LEVL IV, 30-39 MIN: ICD-10-PCS | Mod: S$GLB,,, | Performed by: INTERNAL MEDICINE

## 2022-08-31 PROCEDURE — 3075F PR MOST RECENT SYSTOLIC BLOOD PRESS GE 130-139MM HG: ICD-10-PCS | Mod: CPTII,S$GLB,, | Performed by: INTERNAL MEDICINE

## 2022-08-31 PROCEDURE — 1126F PR PAIN SEVERITY QUANTIFIED, NO PAIN PRESENT: ICD-10-PCS | Mod: CPTII,S$GLB,, | Performed by: INTERNAL MEDICINE

## 2022-08-31 PROCEDURE — 80053 COMPREHEN METABOLIC PANEL: CPT

## 2022-08-31 PROCEDURE — 1160F RVW MEDS BY RX/DR IN RCRD: CPT | Mod: CPTII,S$GLB,, | Performed by: INTERNAL MEDICINE

## 2022-08-31 PROCEDURE — 1126F AMNT PAIN NOTED NONE PRSNT: CPT | Mod: CPTII,S$GLB,, | Performed by: INTERNAL MEDICINE

## 2022-08-31 PROCEDURE — 3288F FALL RISK ASSESSMENT DOCD: CPT | Mod: CPTII,S$GLB,, | Performed by: INTERNAL MEDICINE

## 2022-08-31 PROCEDURE — 3288F PR FALLS RISK ASSESSMENT DOCUMENTED: ICD-10-PCS | Mod: CPTII,S$GLB,, | Performed by: INTERNAL MEDICINE

## 2022-08-31 PROCEDURE — 3079F DIAST BP 80-89 MM HG: CPT | Mod: CPTII,S$GLB,, | Performed by: INTERNAL MEDICINE

## 2022-08-31 PROCEDURE — 99999 PR PBB SHADOW E&M-EST. PATIENT-LVL III: ICD-10-PCS | Mod: PBBFAC,,, | Performed by: INTERNAL MEDICINE

## 2022-08-31 PROCEDURE — 99214 OFFICE O/P EST MOD 30 MIN: CPT | Mod: S$GLB,,, | Performed by: INTERNAL MEDICINE

## 2022-08-31 PROCEDURE — 3079F PR MOST RECENT DIASTOLIC BLOOD PRESSURE 80-89 MM HG: ICD-10-PCS | Mod: CPTII,S$GLB,, | Performed by: INTERNAL MEDICINE

## 2022-08-31 PROCEDURE — 1101F PT FALLS ASSESS-DOCD LE1/YR: CPT | Mod: CPTII,S$GLB,, | Performed by: INTERNAL MEDICINE

## 2022-08-31 PROCEDURE — 1159F PR MEDICATION LIST DOCUMENTED IN MEDICAL RECORD: ICD-10-PCS | Mod: CPTII,S$GLB,, | Performed by: INTERNAL MEDICINE

## 2022-08-31 PROCEDURE — 36415 COLL VENOUS BLD VENIPUNCTURE: CPT

## 2022-08-31 PROCEDURE — 1101F PR PT FALLS ASSESS DOC 0-1 FALLS W/OUT INJ PAST YR: ICD-10-PCS | Mod: CPTII,S$GLB,, | Performed by: INTERNAL MEDICINE

## 2022-08-31 PROCEDURE — 3075F SYST BP GE 130 - 139MM HG: CPT | Mod: CPTII,S$GLB,, | Performed by: INTERNAL MEDICINE

## 2022-08-31 PROCEDURE — 99999 PR PBB SHADOW E&M-EST. PATIENT-LVL III: CPT | Mod: PBBFAC,,, | Performed by: INTERNAL MEDICINE

## 2022-08-31 NOTE — PROGRESS NOTES
HEMATOLOGY/ONCOLOGY OFFICE CLINIC VISIT    Visit Information:  Dx & staging   Initial Consultation: 2021  Referring Physician: Dr Darling  Other Physicians: Dr Zayas  Code Status: Not addressed    Diagnosis/Problem list:   Stage IA2 [id number] RLL NSCLC. Dx 21    Present Treatment:  Observation    Treatment history:    2021 underwent right lower lobectomy with mediastinal lymph node    Plan of care: Surveillance      Imagin2019 Ct Chest:  RLL superior segment 7 mm groundglass density is identified. A RLL12 mm x 7.9 mm mass is noted with marginal spiculation.  2019 PET/CT: Right lower lobe 8 mm nodule is stable in size and demonstrates only low-grade FDG activity. A follow-up chest CT in 3-6 months would be reasonable to monitor.  2020 CT scan chest: showed a right lower lobe 1 cm spiculated nodule, previously 0.8 cm   3/3/2021 CT Chest: enlarging now 2 cm right lower lobe pulmonary nodule concerning for malignancy.      2022 CT CHEST: Impression: 1. Postoperative changes of right lower lobe nodule resection are again seen.  2. There is a 4.5 mm ground-glass nodule in the anterior left upper lobe which appears grossly stable compared to previous CT dated 2022.  Recommend continued close surveillance with CT chest in 6 months.  3. Additional findings and details as above.     Pathology:  2021: right lower lobectomy with mediastinal lymph node dissection   [1]  LYMPH NODE, 9R, LYMPHADENECTOMY: THREE LYMPH NODE NEGATIVE FOR METASTATIC CARCINOMA (0/3).  [2]  LYMPH NODE, 11R, LYMPHADENECTOMY: ONE LYMPH NODE NEGATIVE FOR METASTATIC CARCINOMA (0/1).  [3]  LYMPH NODE, 4R, LYMPHADENECTOMY: ONE LYMPH NODE NEGATIVE FOR METASTATIC CARCINOMA (0/1).  [4]  LUNG, RIGHT LOWER LOBECTOMY: INVASIVE MODERATELY TO POORLY DIFFERENTIATED SQUAMOUS CELL CARCINOMA, KERATINIZING.       -Histologic Grade:  G3: Poorly differentiated   - TUMOR SIZE:  1.9 CM.   - MARGINS NEGATIVE.   - LYMPHOVASCULAR  "INVASION NOT IDENTIFIED.      -TWO LYMPH NODES NEGATIVE FOR METASTATIC CARCINOMA (0/2).  IMMUNOHISTOCHEMICAL STAIN:  p63: Positive. CK7, TTF-1:  Negative.  Primary Tumor (pT):  pT1b, Regional Lymph Nodes (pN):  pN0         CLINICAL HISTORY:       Patient: 77 year old female kindly referred for lung cancer.  Patient with a 50-pack-year history (used to smoke 3 ppd) but stopped smoking about 30 years ago. She was follow closely for pulmonary nodule.  On 2020 CT scan of the chest showed a right lower lobe 1 cm spiculated nodule, previously 0.8 cm on 2019.  CT was repeated on 3/3/2021 that revealed an enlarging now 2 cm right lower lobe pulmonary nodule concerning for malignancy.    Patient is here today with her .  She denies any fever, chills, sweats.  No chest pain or shortness of breath.  She still have some tenderness in the area of surgery, that is well-healed.  No headaches or changes in vision.  No changes in bowel habits.    Patient have a sister that  of pancreatic cancer.  Brother and father both had cancer that spread to the bones.  No family history of lung cancer or other malignancies.       Chief Complaint: No Concerns today      Interval History:  Patient presents today for follow up to discuss CT scan results. She is with her . She is doing well. Denies fever, chills or sweats. No chest pain or shortness of breath. No abnormal bleeding.    ROS:  All 14 points ROS taken and positive as per Interval History, all other negative.    Histories:  PMH/PSH/FH/SOCIAL/ALLERGIES AND MEDS REVIEWED AND UPDATED AS APPROPRIATE       Vitals:    22 1046   BP: 138/80   BP Location: Right arm   Patient Position: Sitting   Pulse: 65   Temp: 98.1 °F (36.7 °C)   TempSrc: Oral   SpO2: 99%   Weight: 75.8 kg (167 lb)   Height: 5' 2" (1.575 m)      Physical Exam  Vitals and nursing note reviewed.   Constitutional:       General: She is not in acute distress.     Appearance: Normal appearance. " She is well-developed.   HENT:      Head: Normocephalic and atraumatic.      Mouth/Throat:      Mouth: Mucous membranes are moist.   Eyes:      General: No scleral icterus.     Extraocular Movements: Extraocular movements intact.      Conjunctiva/sclera: Conjunctivae normal.      Pupils: Pupils are equal, round, and reactive to light.   Neck:      Vascular: No JVD.   Cardiovascular:      Rate and Rhythm: Normal rate and regular rhythm.      Heart sounds: No murmur heard.  Pulmonary:      Effort: Pulmonary effort is normal.      Breath sounds: Normal breath sounds. No wheezing or rhonchi.   Abdominal:      General: Bowel sounds are normal. There is no distension.      Palpations: Abdomen is soft. There is no mass.      Tenderness: There is no abdominal tenderness.   Musculoskeletal:         General: No swelling or deformity.      Cervical back: Neck supple.   Lymphadenopathy:      Cervical: No cervical adenopathy.      Lower Body: No right inguinal adenopathy. No left inguinal adenopathy.   Skin:     General: Skin is warm.      Coloration: Skin is not jaundiced.      Findings: No lesion or rash.      Nails: There is no clubbing.   Neurological:      General: No focal deficit present.      Mental Status: She is alert and oriented to person, place, and time.      Sensory: Sensation is intact.      Motor: Motor function is intact.      Gait: Gait is intact.   Psychiatric:         Attention and Perception: Attention normal.         Mood and Affect: Mood and affect normal.         Speech: Speech normal.         Behavior: Behavior is cooperative.         Thought Content: Thought content normal.         Cognition and Memory: Cognition normal.         Judgment: Judgment normal.     ECOG SCORE             Laboratory:  CBC with Differential:  Lab Results   Component Value Date    WBC 7.6 08/31/2022    RBC 4.81 08/31/2022    HGB 12.6 08/31/2022    HCT 41.0 08/31/2022    MCV 85.2 08/31/2022    MCH 26.2 (L) 08/31/2022    [facility]  30.7 (L) 08/31/2022    RDW 13.1 08/31/2022     08/31/2022    MPV 9.6 08/31/2022        CMP:  Sodium Level   Date Value Ref Range Status   08/31/2022 142 136 - 145 mmol/L Final     Potassium Level   Date Value Ref Range Status   08/31/2022 4.2 3.5 - 5.1 mmol/L Final     Carbon Dioxide   Date Value Ref Range Status   08/31/2022 30 23 - 31 mmol/L Final     Blood Urea Nitrogen   Date Value Ref Range Status   08/31/2022 16.7 9.8 - 20.1 mg/dL Final     Creatinine   Date Value Ref Range Status   08/31/2022 1.03 (H) 0.55 - 1.02 mg/dL Final     Calcium Level Total   Date Value Ref Range Status   08/31/2022 9.3 8.4 - 10.2 mg/dL Final     Albumin Level   Date Value Ref Range Status   08/31/2022 3.7 3.4 - 4.8 gm/dL Final     Bilirubin Total   Date Value Ref Range Status   08/31/2022 0.9 <=1.5 mg/dL Final     Alkaline Phosphatase   Date Value Ref Range Status   08/31/2022 63 40 - 150 unit/L Final     Aspartate Aminotransferase   Date Value Ref Range Status   08/31/2022 27 5 - 34 unit/L Final     Alanine Aminotransferase   Date Value Ref Range Status   08/31/2022 21 0 - 55 unit/L Final     Estimated GFR-Non    Date Value Ref Range Status   05/26/2022 50 mls/min/1.73/m2 Final             Assessment:       1. 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        Stage IA2 [id number] RLL NSCLC Dx 4/5/2021   --Mod-poorly differentiated squamous cell carcinoma   --Tumor 1.9 cm, no lymphovascular invasion identify, visceral pleural invasion not identify, margins clear   --0/7 LN pos  NCCN guidelines surveillance:   --for stage I ([id number])--> margins negative (R0)--> observation (NSCL-4)   --for surveillance after completion of definitive therapy--> H&P and chest CTq6 months x 2-3 yrs, then H&P + low-dose noncontrast enhanced CT annually (NSCL-16)          Plan:       Patient diagnosed with early stage (1A2) non-small cell lung carcinoma, margins negative in April 2021.    RTC 3 months or earlier if needed  with CBC, CMP   Will repeat CT chest w contrast every 6 months x 5 years, then low dose CT q year -- will order next visit     Encouraged to call with questions or problems  The patient was given ample opportunity to ask questions and they were all answered to satisfaction; patient demonstrated understanding of what we discussed and is agreeable to plan.     KWAKU PALOMARES MD      Professional Services   I, Melissa Judge LPN, acted solely as a scribe for and in the presence of Dr. Kwaku Palomares, who performed these services.

## 2022-12-05 ENCOUNTER — TELEPHONE (OUTPATIENT)
Dept: HEMATOLOGY/ONCOLOGY | Facility: CLINIC | Age: 77
End: 2022-12-05

## 2022-12-05 ENCOUNTER — LAB VISIT (OUTPATIENT)
Dept: LAB | Facility: HOSPITAL | Age: 77
End: 2022-12-05
Attending: INTERNAL MEDICINE
Payer: MEDICARE

## 2022-12-05 ENCOUNTER — OFFICE VISIT (OUTPATIENT)
Dept: HEMATOLOGY/ONCOLOGY | Facility: CLINIC | Age: 77
End: 2022-12-05
Payer: MEDICARE

## 2022-12-05 VITALS
HEART RATE: 66 BPM | TEMPERATURE: 98 F | DIASTOLIC BLOOD PRESSURE: 84 MMHG | OXYGEN SATURATION: 97 % | BODY MASS INDEX: 30.36 KG/M2 | WEIGHT: 166 LBS | SYSTOLIC BLOOD PRESSURE: 138 MMHG

## 2022-12-05 DIAGNOSIS — C34.31 SQUAMOUS CELL CARCINOMA OF BRONCHUS IN RIGHT LOWER LOBE: ICD-10-CM

## 2022-12-05 DIAGNOSIS — C34.31 SQUAMOUS CELL CARCINOMA OF BRONCHUS IN RIGHT LOWER LOBE: Primary | ICD-10-CM

## 2022-12-05 LAB
ALBUMIN SERPL-MCNC: 3.9 GM/DL (ref 3.4–4.8)
ALBUMIN/GLOB SERPL: 1.3 RATIO (ref 1.1–2)
ALP SERPL-CCNC: 70 UNIT/L (ref 40–150)
ALT SERPL-CCNC: 20 UNIT/L (ref 0–55)
AST SERPL-CCNC: 25 UNIT/L (ref 5–34)
BASOPHILS # BLD AUTO: 0.05 X10(3)/MCL (ref 0–0.2)
BASOPHILS NFR BLD AUTO: 0.6 %
BILIRUBIN DIRECT+TOT PNL SERPL-MCNC: 0.8 MG/DL
BUN SERPL-MCNC: 18.7 MG/DL (ref 9.8–20.1)
CALCIUM SERPL-MCNC: 9.7 MG/DL (ref 8.4–10.2)
CHLORIDE SERPL-SCNC: 106 MMOL/L (ref 98–107)
CO2 SERPL-SCNC: 29 MMOL/L (ref 23–31)
CREAT SERPL-MCNC: 1.06 MG/DL (ref 0.55–1.02)
EOSINOPHIL # BLD AUTO: 0.21 X10(3)/MCL (ref 0–0.9)
EOSINOPHIL NFR BLD AUTO: 2.5 %
ERYTHROCYTE [DISTWIDTH] IN BLOOD BY AUTOMATED COUNT: 13.2 % (ref 11.5–17)
GFR SERPLBLD CREATININE-BSD FMLA CKD-EPI: 54 MLS/MIN/1.73/M2
GLOBULIN SER-MCNC: 3.1 GM/DL (ref 2.4–3.5)
GLUCOSE SERPL-MCNC: 101 MG/DL (ref 82–115)
HCT VFR BLD AUTO: 41 % (ref 37–47)
HGB BLD-MCNC: 12.8 GM/DL (ref 12–16)
IMM GRANULOCYTES # BLD AUTO: 0.03 X10(3)/MCL (ref 0–0.04)
IMM GRANULOCYTES NFR BLD AUTO: 0.4 %
LYMPHOCYTES # BLD AUTO: 1.85 X10(3)/MCL (ref 0.6–4.6)
LYMPHOCYTES NFR BLD AUTO: 21.9 %
MCH RBC QN AUTO: 26.6 PG (ref 27–31)
MCHC RBC AUTO-ENTMCNC: 31.2 MG/DL (ref 33–36)
MCV RBC AUTO: 85.1 FL (ref 80–94)
MONOCYTES # BLD AUTO: 0.76 X10(3)/MCL (ref 0.1–1.3)
MONOCYTES NFR BLD AUTO: 9 %
NEUTROPHILS # BLD AUTO: 5.5 X10(3)/MCL (ref 2.1–9.2)
NEUTROPHILS NFR BLD AUTO: 65.6 %
PLATELET # BLD AUTO: 222 X10(3)/MCL (ref 130–400)
PMV BLD AUTO: 9.6 FL (ref 7.4–10.4)
POTASSIUM SERPL-SCNC: 4.6 MMOL/L (ref 3.5–5.1)
PROT SERPL-MCNC: 7 GM/DL (ref 5.8–7.6)
RBC # BLD AUTO: 4.82 X10(6)/MCL (ref 4.2–5.4)
SODIUM SERPL-SCNC: 143 MMOL/L (ref 136–145)
WBC # SPEC AUTO: 8.4 X10(3)/MCL (ref 4.5–11.5)

## 2022-12-05 PROCEDURE — 99214 OFFICE O/P EST MOD 30 MIN: CPT | Mod: S$GLB,,, | Performed by: INTERNAL MEDICINE

## 2022-12-05 PROCEDURE — 80053 COMPREHEN METABOLIC PANEL: CPT

## 2022-12-05 PROCEDURE — 1160F PR REVIEW ALL MEDS BY PRESCRIBER/CLIN PHARMACIST DOCUMENTED: ICD-10-PCS | Mod: CPTII,S$GLB,, | Performed by: INTERNAL MEDICINE

## 2022-12-05 PROCEDURE — 36415 COLL VENOUS BLD VENIPUNCTURE: CPT

## 2022-12-05 PROCEDURE — 99999 PR PBB SHADOW E&M-EST. PATIENT-LVL III: ICD-10-PCS | Mod: PBBFAC,,, | Performed by: INTERNAL MEDICINE

## 2022-12-05 PROCEDURE — 99214 PR OFFICE/OUTPT VISIT, EST, LEVL IV, 30-39 MIN: ICD-10-PCS | Mod: S$GLB,,, | Performed by: INTERNAL MEDICINE

## 2022-12-05 PROCEDURE — 1101F PT FALLS ASSESS-DOCD LE1/YR: CPT | Mod: CPTII,S$GLB,, | Performed by: INTERNAL MEDICINE

## 2022-12-05 PROCEDURE — 85025 COMPLETE CBC W/AUTO DIFF WBC: CPT

## 2022-12-05 PROCEDURE — 3288F FALL RISK ASSESSMENT DOCD: CPT | Mod: CPTII,S$GLB,, | Performed by: INTERNAL MEDICINE

## 2022-12-05 PROCEDURE — 3075F SYST BP GE 130 - 139MM HG: CPT | Mod: CPTII,S$GLB,, | Performed by: INTERNAL MEDICINE

## 2022-12-05 PROCEDURE — 3075F PR MOST RECENT SYSTOLIC BLOOD PRESS GE 130-139MM HG: ICD-10-PCS | Mod: CPTII,S$GLB,, | Performed by: INTERNAL MEDICINE

## 2022-12-05 PROCEDURE — 1126F PR PAIN SEVERITY QUANTIFIED, NO PAIN PRESENT: ICD-10-PCS | Mod: CPTII,S$GLB,, | Performed by: INTERNAL MEDICINE

## 2022-12-05 PROCEDURE — 1126F AMNT PAIN NOTED NONE PRSNT: CPT | Mod: CPTII,S$GLB,, | Performed by: INTERNAL MEDICINE

## 2022-12-05 PROCEDURE — 1160F RVW MEDS BY RX/DR IN RCRD: CPT | Mod: CPTII,S$GLB,, | Performed by: INTERNAL MEDICINE

## 2022-12-05 PROCEDURE — 3288F PR FALLS RISK ASSESSMENT DOCUMENTED: ICD-10-PCS | Mod: CPTII,S$GLB,, | Performed by: INTERNAL MEDICINE

## 2022-12-05 PROCEDURE — 1159F MED LIST DOCD IN RCRD: CPT | Mod: CPTII,S$GLB,, | Performed by: INTERNAL MEDICINE

## 2022-12-05 PROCEDURE — 3079F PR MOST RECENT DIASTOLIC BLOOD PRESSURE 80-89 MM HG: ICD-10-PCS | Mod: CPTII,S$GLB,, | Performed by: INTERNAL MEDICINE

## 2022-12-05 PROCEDURE — 99999 PR PBB SHADOW E&M-EST. PATIENT-LVL III: CPT | Mod: PBBFAC,,, | Performed by: INTERNAL MEDICINE

## 2022-12-05 PROCEDURE — 1101F PR PT FALLS ASSESS DOC 0-1 FALLS W/OUT INJ PAST YR: ICD-10-PCS | Mod: CPTII,S$GLB,, | Performed by: INTERNAL MEDICINE

## 2022-12-05 PROCEDURE — 1159F PR MEDICATION LIST DOCUMENTED IN MEDICAL RECORD: ICD-10-PCS | Mod: CPTII,S$GLB,, | Performed by: INTERNAL MEDICINE

## 2022-12-05 PROCEDURE — 3079F DIAST BP 80-89 MM HG: CPT | Mod: CPTII,S$GLB,, | Performed by: INTERNAL MEDICINE

## 2022-12-05 NOTE — PROGRESS NOTES
HEMATOLOGY/ONCOLOGY OFFICE CLINIC VISIT    Visit Information:  Dx & staging   Initial Consultation: 2021  Referring Physician: Dr Darling  Other Physicians: Dr Zayas  Code Status: Not addressed    Diagnosis/Problem list:   Stage IA2 [id number] RLL NSCLC. Dx 21    Present Treatment:  Observation    Treatment history:    2021 underwent right lower lobectomy with mediastinal lymph node    Plan of care: Surveillance      Imagin2019 Ct Chest:  RLL superior segment 7 mm groundglass density is identified. A RLL12 mm x 7.9 mm mass is noted with marginal spiculation.  2019 PET/CT: Right lower lobe 8 mm nodule is stable in size and demonstrates only low-grade FDG activity. A follow-up chest CT in 3-6 months would be reasonable to monitor.  2020 CT scan chest: showed a right lower lobe 1 cm spiculated nodule, previously 0.8 cm   3/3/2021 CT Chest: enlarging now 2 cm right lower lobe pulmonary nodule concerning for malignancy.      2022 CT CHEST: Impression: 1. Postoperative changes of right lower lobe nodule resection are again seen.  2. There is a 4.5 mm ground-glass nodule in the anterior left upper lobe which appears grossly stable compared to previous CT dated 2022.  Recommend continued close surveillance with CT chest in 6 months.  3. Additional findings and details as above.     Pathology:  2021: right lower lobectomy with mediastinal lymph node dissection   [1]  LYMPH NODE, 9R, LYMPHADENECTOMY: THREE LYMPH NODE NEGATIVE FOR METASTATIC CARCINOMA (0/3).  [2]  LYMPH NODE, 11R, LYMPHADENECTOMY: ONE LYMPH NODE NEGATIVE FOR METASTATIC CARCINOMA (0/1).  [3]  LYMPH NODE, 4R, LYMPHADENECTOMY: ONE LYMPH NODE NEGATIVE FOR METASTATIC CARCINOMA (0/1).  [4]  LUNG, RIGHT LOWER LOBECTOMY: INVASIVE MODERATELY TO POORLY DIFFERENTIATED SQUAMOUS CELL CARCINOMA, KERATINIZING.       -Histologic Grade:  G3: Poorly differentiated   - TUMOR SIZE:  1.9 CM.   - MARGINS NEGATIVE.   - LYMPHOVASCULAR  INVASION NOT IDENTIFIED.      -TWO LYMPH NODES NEGATIVE FOR METASTATIC CARCINOMA (0/2).  IMMUNOHISTOCHEMICAL STAIN:  p63: Positive. CK7, TTF-1:  Negative.  Primary Tumor (pT):  pT1b, Regional Lymph Nodes (pN):  pN0     CLINICAL HISTORY:       Patient: 77 year old female kindly referred for lung cancer.  Patient with a 50-pack-year history (used to smoke 3 ppd) but stopped smoking about 30 years ago. She was follow closely for pulmonary nodule.  On 2020 CT scan of the chest showed a right lower lobe 1 cm spiculated nodule, previously 0.8 cm on 2019.  CT was repeated on 3/3/2021 that revealed an enlarging now 2 cm right lower lobe pulmonary nodule concerning for malignancy.    Patient is here today with her .  She denies any fever, chills, sweats.  No chest pain or shortness of breath.  She still have some tenderness in the area of surgery, that is well-healed.  No headaches or changes in vision.  No changes in bowel habits.    Patient have a sister that  of pancreatic cancer.  Brother and father both had cancer that spread to the bones.  No family history of lung cancer or other malignancies.       Chief Complaint: No Concerns today      Interval History:  Patient presents today for follow up in surveillance of her lung cancer. She is with her . She is doing well and states she is feeling good.  Denies fever, chills or sweats. No chest pain or shortness of breath. No abnormal bleeding.    ROS:  All 14 points ROS taken and positive as per Interval History, all other negative.    Histories:  PMH/PSH/FH/SOCIAL/ALLERGIES AND MEDS REVIEWED AND UPDATED AS APPROPRIATE       Vitals:    22 0952   BP: 138/84   BP Location: Left arm   Patient Position: Sitting   Pulse: 66   Temp: 98.1 °F (36.7 °C)   TempSrc: Oral   SpO2: 97%   Weight: 75.3 kg (166 lb)      Physical Exam  Vitals and nursing note reviewed.   Constitutional:       General: She is not in acute distress.     Appearance: Normal  appearance. She is well-developed.   HENT:      Head: Normocephalic and atraumatic.      Mouth/Throat:      Mouth: Mucous membranes are moist.   Eyes:      General: No scleral icterus.     Extraocular Movements: Extraocular movements intact.      Conjunctiva/sclera: Conjunctivae normal.      Pupils: Pupils are equal, round, and reactive to light.   Neck:      Vascular: No JVD.   Cardiovascular:      Rate and Rhythm: Normal rate and regular rhythm.      Heart sounds: No murmur heard.  Pulmonary:      Effort: Pulmonary effort is normal.      Breath sounds: Normal breath sounds. No wheezing or rhonchi.   Abdominal:      General: Bowel sounds are normal. There is no distension.      Palpations: Abdomen is soft. There is no mass.      Tenderness: There is no abdominal tenderness.   Musculoskeletal:         General: No swelling or deformity.      Cervical back: Neck supple.   Lymphadenopathy:      Cervical: No cervical adenopathy.      Lower Body: No right inguinal adenopathy. No left inguinal adenopathy.   Skin:     General: Skin is warm.      Coloration: Skin is not jaundiced.      Findings: No lesion or rash.      Nails: There is no clubbing.   Neurological:      General: No focal deficit present.      Mental Status: She is alert and oriented to person, place, and time.      Sensory: Sensation is intact.      Motor: Motor function is intact.      Gait: Gait is intact.   Psychiatric:         Attention and Perception: Attention normal.         Mood and Affect: Mood and affect normal.         Speech: Speech normal.         Behavior: Behavior is cooperative.         Thought Content: Thought content normal.         Cognition and Memory: Cognition normal.         Judgment: Judgment normal.     ECOG SCORE             Laboratory:  CBC with Differential:  Lab Results   Component Value Date    WBC 8.4 12/05/2022    RBC 4.82 12/05/2022    HGB 12.8 12/05/2022    HCT 41.0 12/05/2022    MCV 85.1 12/05/2022    MCH 26.6 (L) 12/05/2022     MCHC 31.2 (L) 12/05/2022    RDW 13.2 12/05/2022     12/05/2022    MPV 9.6 12/05/2022        CMP:  Sodium Level   Date Value Ref Range Status   08/31/2022 142 136 - 145 mmol/L Final     Potassium Level   Date Value Ref Range Status   08/31/2022 4.2 3.5 - 5.1 mmol/L Final     Carbon Dioxide   Date Value Ref Range Status   08/31/2022 30 23 - 31 mmol/L Final     Blood Urea Nitrogen   Date Value Ref Range Status   08/31/2022 16.7 9.8 - 20.1 mg/dL Final     Creatinine   Date Value Ref Range Status   08/31/2022 1.03 (H) 0.55 - 1.02 mg/dL Final     Calcium Level Total   Date Value Ref Range Status   08/31/2022 9.3 8.4 - 10.2 mg/dL Final     Albumin Level   Date Value Ref Range Status   08/31/2022 3.7 3.4 - 4.8 gm/dL Final     Bilirubin Total   Date Value Ref Range Status   08/31/2022 0.9 <=1.5 mg/dL Final     Alkaline Phosphatase   Date Value Ref Range Status   08/31/2022 63 40 - 150 unit/L Final     Aspartate Aminotransferase   Date Value Ref Range Status   08/31/2022 27 5 - 34 unit/L Final     Alanine Aminotransferase   Date Value Ref Range Status   08/31/2022 21 0 - 55 unit/L Final     Estimated GFR-Non    Date Value Ref Range Status   05/26/2022 50 mls/min/1.73/m2 Final         Assessment:       1. 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      Stage IA2 [id number] RLL NSCLC Dx 4/5/2021   --Mod-poorly differentiated squamous cell carcinoma   --Tumor 1.9 cm, no lymphovascular invasion identify, visceral pleural invasion not identify, margins clear   --0/7 LN pos  NCCN guidelines surveillance:   --for stage I ([id number])--> margins negative (R0)--> observation (NSCL-4)   --for surveillance after completion of definitive therapy--> H&P and chest CTq6 months x 2-3 yrs, then H&P + low-dose noncontrast enhanced CT annually (NSCL-16)        Plan:     Patient diagnosed with early stage (1A2) non-small cell lung carcinoma, margins negative in April 2021.    RTC 3 months or earlier if needed  with CBC, CMP   Will repeat CT chest w contrast every 6 months x 5 years, then low dose CT q year-ordered  CMP today pending, will call if abnormal   Encouraged to call with questions or problems  The patient was given ample opportunity to ask questions and they were all answered to satisfaction; patient demonstrated understanding of what we discussed and is agreeable to plan.     KWAKU PALOMARES MD      Professional Services   I, Melissa Judge LPN, acted solely as a scribe for and in the presence of Dr. Kwaku Palomares, who performed these services.

## 2023-01-17 ENCOUNTER — OFFICE VISIT (OUTPATIENT)
Dept: URGENT CARE | Facility: CLINIC | Age: 78
End: 2023-01-17
Payer: MEDICARE

## 2023-01-17 VITALS
RESPIRATION RATE: 18 BRPM | SYSTOLIC BLOOD PRESSURE: 144 MMHG | WEIGHT: 162 LBS | OXYGEN SATURATION: 98 % | BODY MASS INDEX: 29.81 KG/M2 | DIASTOLIC BLOOD PRESSURE: 84 MMHG | HEART RATE: 71 BPM | HEIGHT: 62 IN | TEMPERATURE: 98 F

## 2023-01-17 DIAGNOSIS — B02.9 HERPES ZOSTER WITHOUT COMPLICATION: Primary | ICD-10-CM

## 2023-01-17 PROCEDURE — 3077F PR MOST RECENT SYSTOLIC BLOOD PRESSURE >= 140 MM HG: ICD-10-PCS | Mod: CPTII,,, | Performed by: PHYSICIAN ASSISTANT

## 2023-01-17 PROCEDURE — 99203 OFFICE O/P NEW LOW 30 MIN: CPT | Mod: ,,, | Performed by: PHYSICIAN ASSISTANT

## 2023-01-17 PROCEDURE — 99203 PR OFFICE/OUTPT VISIT, NEW, LEVL III, 30-44 MIN: ICD-10-PCS | Mod: ,,, | Performed by: PHYSICIAN ASSISTANT

## 2023-01-17 PROCEDURE — 3079F PR MOST RECENT DIASTOLIC BLOOD PRESSURE 80-89 MM HG: ICD-10-PCS | Mod: CPTII,,, | Performed by: PHYSICIAN ASSISTANT

## 2023-01-17 PROCEDURE — 3077F SYST BP >= 140 MM HG: CPT | Mod: CPTII,,, | Performed by: PHYSICIAN ASSISTANT

## 2023-01-17 PROCEDURE — 3079F DIAST BP 80-89 MM HG: CPT | Mod: CPTII,,, | Performed by: PHYSICIAN ASSISTANT

## 2023-01-17 PROCEDURE — 1159F PR MEDICATION LIST DOCUMENTED IN MEDICAL RECORD: ICD-10-PCS | Mod: CPTII,,, | Performed by: PHYSICIAN ASSISTANT

## 2023-01-17 PROCEDURE — 1160F PR REVIEW ALL MEDS BY PRESCRIBER/CLIN PHARMACIST DOCUMENTED: ICD-10-PCS | Mod: CPTII,,, | Performed by: PHYSICIAN ASSISTANT

## 2023-01-17 PROCEDURE — 1160F RVW MEDS BY RX/DR IN RCRD: CPT | Mod: CPTII,,, | Performed by: PHYSICIAN ASSISTANT

## 2023-01-17 PROCEDURE — 1159F MED LIST DOCD IN RCRD: CPT | Mod: CPTII,,, | Performed by: PHYSICIAN ASSISTANT

## 2023-01-17 RX ORDER — VALACYCLOVIR HYDROCHLORIDE 1 G/1
1000 TABLET, FILM COATED ORAL 3 TIMES DAILY
Qty: 21 TABLET | Refills: 0 | Status: SHIPPED | OUTPATIENT
Start: 2023-01-17 | End: 2023-01-24

## 2023-01-17 RX ORDER — LIDOCAINE 50 MG/G
1 PATCH TOPICAL DAILY
Qty: 5 PATCH | Refills: 0 | Status: SHIPPED | OUTPATIENT
Start: 2023-01-17

## 2023-01-17 NOTE — PATIENT INSTRUCTIONS
Recommend starting valacyclovir today to help reduce rash outbreak.  Recommend lidocaine patches to right chest wall rash and blisters to help reduce pain and sensitivity.  Recommend alternating Tylenol and ibuprofen every 6-8 hours if needed for mild-to-moderate pain and inflammation.  Recommend Benadryl sparingly lowest dose as needed for severe itching.  Recommend follow-up with primary care physician in 1-2 week for rash recheck.

## 2023-01-17 NOTE — PROGRESS NOTES
"Subjective:       Patient ID: Amanda Colmenares is a 78 y.o. female.    Vitals:  height is 5' 2" (1.575 m) and weight is 73.5 kg (162 lb). Her oral temperature is 97.8 °F (36.6 °C). Her blood pressure is 144/84 (abnormal) and her pulse is 71. Her respiration is 18 and oxygen saturation is 98%.     Chief Complaint: Rash (Rash on abdomen and under armpits x 2 weeks )    HPI  patient with history of lung cancer currently on diabetic medication reports in the last 2 weeks developing right sided chest wall itching and blister rash presents to urgent care today for initial evaluation.   Rash     Additional comments: Rash on abdomen and under armpits x 2 weeks       Constitution: Negative for chills and fever.   Respiratory:  Negative for shortness of breath.    Gastrointestinal:  Negative for abdominal pain.   Genitourinary:  Negative for dysuria, flank pain and bladder incontinence.   Musculoskeletal:  Negative for trauma and muscle ache.   Skin:  Positive for rash, lesion and erythema. Negative for abscess and hives.   Allergic/Immunologic: Positive for itching. Negative for hives.   Neurological:  Negative for numbness and tingling.   Psychiatric/Behavioral: Negative.       Objective:      Physical Exam   Constitutional: She is oriented to person, place, and time. She appears well-developed.  Non-toxic appearance. She does not appear ill. No distress.      Comments:Awake alert pleasant ambulatory female     HENT:   Head: Normocephalic. Head is without abrasion, without contusion and without laceration.   Mouth/Throat: Oropharynx is clear and moist and mucous membranes are normal.   Eyes: EOM and lids are normal.   Neck: Trachea normal and phonation normal. Neck supple.   Cardiovascular: Normal rate, regular rhythm and normal pulses.   Pulmonary/Chest: Effort normal. No respiratory distress.       Abdominal: Normal appearance. She exhibits no distension. Soft. flat abdomen There is no abdominal tenderness. "   Musculoskeletal: Normal range of motion.         General: Normal range of motion.   Neurological: no focal deficit. She is alert and oriented to person, place, and time.   Skin: Skin is warm, dry, intact, not diaphoretic and no rash. Capillary refill takes less than 2 seconds. erythema No abrasion, No burn, No bruising and No ecchymosis   Psychiatric: Her speech is normal and behavior is normal. Mood, judgment and thought content normal.   Nursing note and vitals reviewed.         Previous History      Review of patient's allergies indicates:   Allergen Reactions    Aspirin-caffeine Other (See Comments)    Morphine Other (See Comments)    Oxycodone-acetaminophen Rash       History reviewed. No pertinent past medical history.  Current Outpatient Medications   Medication Instructions    enalapril (VASOTEC) 2.5 MG tablet No dose, route, or frequency recorded.    ezetimibe-simvastatin 10-80 mg (VYTORIN) 10-80 mg per tablet No dose, route, or frequency recorded.    glipiZIDE (GLUCOTROL) 10 MG tablet No dose, route, or frequency recorded.    LIDOcaine (LIDODERM) 5 % 1 patch, Transdermal, Daily, Remove & Discard patch within 12 hours or as directed by MD    metFORMIN (GLUCOPHAGE) 1000 MG tablet No dose, route, or frequency recorded.    mirtazapine (REMERON) 15 MG tablet No dose, route, or frequency recorded.    TRUE METRIX GLUCOSE METER kit No dose, route, or frequency recorded.    TRUE METRIX GLUCOSE TEST STRIP Strp No dose, route, or frequency recorded.    TRUEPLUS LANCETS 33 gauge Misc No dose, route, or frequency recorded.    valACYclovir (VALTREX) 1,000 mg, Oral, 3 times daily     History reviewed. No pertinent surgical history.  History reviewed. No pertinent family history.    Social History     Tobacco Use    Smoking status: Never    Smokeless tobacco: Never   Substance Use Topics    Alcohol use: Never    Drug use: Never        Physical Exam      Vital Signs Reviewed   BP (!) 144/84   Pulse 71   Temp 97.8 °F  "(36.6 °C) (Oral)   Resp 18   Ht 5' 2" (1.575 m)   Wt 73.5 kg (162 lb)   LMP  (LMP Unknown)   SpO2 98%   BMI 29.63 kg/m²        Procedures    Procedures     Labs     Results for orders placed or performed in visit on 12/05/22   Comprehensive Metabolic Panel   Result Value Ref Range    Sodium Level 143 136 - 145 mmol/L    Potassium Level 4.6 3.5 - 5.1 mmol/L    Chloride 106 98 - 107 mmol/L    Carbon Dioxide 29 23 - 31 mmol/L    Glucose Level 101 82 - 115 mg/dL    Blood Urea Nitrogen 18.7 9.8 - 20.1 mg/dL    Creatinine 1.06 (H) 0.55 - 1.02 mg/dL    Calcium Level Total 9.7 8.4 - 10.2 mg/dL    Protein Total 7.0 5.8 - 7.6 gm/dL    Albumin Level 3.9 3.4 - 4.8 gm/dL    Globulin 3.1 2.4 - 3.5 gm/dL    Albumin/Globulin Ratio 1.3 1.1 - 2.0 ratio    Bilirubin Total 0.8 <=1.5 mg/dL    Alkaline Phosphatase 70 40 - 150 unit/L    Alanine Aminotransferase 20 0 - 55 unit/L    Aspartate Aminotransferase 25 5 - 34 unit/L    eGFR 54 mls/min/1.73/m2   CBC with Differential   Result Value Ref Range    WBC 8.4 4.5 - 11.5 x10(3)/mcL    RBC 4.82 4.20 - 5.40 x10(6)/mcL    Hgb 12.8 12.0 - 16.0 gm/dL    Hct 41.0 37.0 - 47.0 %    MCV 85.1 80.0 - 94.0 fL    MCH 26.6 (L) 27.0 - 31.0 pg    MCHC 31.2 (L) 33.0 - 36.0 mg/dL    RDW 13.2 11.5 - 17.0 %    Platelet 222 130 - 400 x10(3)/mcL    MPV 9.6 7.4 - 10.4 fL    Neut % 65.6 %    Lymph % 21.9 %    Mono % 9.0 %    Eos % 2.5 %    Basophil % 0.6 %    Lymph # 1.85 0.6 - 4.6 x10(3)/mcL    Neut # 5.5 2.1 - 9.2 x10(3)/mcL    Mono # 0.76 0.1 - 1.3 x10(3)/mcL    Eos # 0.21 0 - 0.9 x10(3)/mcL    Baso # 0.05 0 - 0.2 x10(3)/mcL    IG# 0.03 0 - 0.04 x10(3)/mcL    IG% 0.4 %       Assessment:       1. Herpes zoster without complication          Plan:       Recommend starting valacyclovir today to help reduce rash outbreak.  Recommend lidocaine patches to right chest wall rash and blisters to help reduce pain and sensitivity.  Recommend alternating Tylenol and ibuprofen every 6-8 hours if needed for " mild-to-moderate pain and inflammation.  Recommend Benadryl sparingly lowest dose as needed for severe itching.  Recommend follow-up with primary care physician in 1-2 week for rash recheck.  Herpes zoster without complication    Other orders  -     valACYclovir (VALTREX) 1000 MG tablet; Take 1 tablet (1,000 mg total) by mouth 3 (three) times daily. for 7 days  Dispense: 21 tablet; Refill: 0  -     LIDOcaine (LIDODERM) 5 %; Place 1 patch onto the skin once daily. Remove & Discard patch within 12 hours or as directed by MD  Dispense: 5 patch; Refill: 0

## 2023-02-20 ENCOUNTER — HOSPITAL ENCOUNTER (OUTPATIENT)
Dept: RADIOLOGY | Facility: HOSPITAL | Age: 78
Discharge: HOME | End: 2023-02-20
Attending: INTERNAL MEDICINE
Payer: MEDICARE

## 2023-02-20 DIAGNOSIS — C34.31 SQUAMOUS CELL CARCINOMA OF BRONCHUS IN RIGHT LOWER LOBE: ICD-10-CM

## 2023-02-20 LAB
CREAT SERPL-MCNC: 1.3 MG/DL (ref 0.5–1.4)
SAMPLE: NORMAL

## 2023-02-20 PROCEDURE — 71260 CT THORAX DX C+: CPT | Mod: TC

## 2023-02-20 PROCEDURE — 25500020 PHARM REV CODE 255: Performed by: INTERNAL MEDICINE

## 2023-02-20 RX ADMIN — IOPAMIDOL 100 ML: 755 INJECTION, SOLUTION INTRAVENOUS at 08:02

## 2023-03-06 ENCOUNTER — LAB VISIT (OUTPATIENT)
Dept: LAB | Facility: HOSPITAL | Age: 78
End: 2023-03-06
Payer: MEDICARE

## 2023-03-06 ENCOUNTER — OFFICE VISIT (OUTPATIENT)
Dept: HEMATOLOGY/ONCOLOGY | Facility: CLINIC | Age: 78
End: 2023-03-06
Payer: MEDICARE

## 2023-03-06 VITALS
DIASTOLIC BLOOD PRESSURE: 81 MMHG | HEART RATE: 70 BPM | WEIGHT: 165 LBS | SYSTOLIC BLOOD PRESSURE: 134 MMHG | OXYGEN SATURATION: 99 % | TEMPERATURE: 98 F | BODY MASS INDEX: 30.18 KG/M2

## 2023-03-06 DIAGNOSIS — C34.90 MALIGNANT NEOPLASM OF UNSPECIFIED PART OF UNSPECIFIED BRONCHUS OR LUNG: Primary | ICD-10-CM

## 2023-03-06 DIAGNOSIS — C34.31 SQUAMOUS CELL CARCINOMA OF BRONCHUS IN RIGHT LOWER LOBE: ICD-10-CM

## 2023-03-06 LAB
ALBUMIN SERPL-MCNC: 3.6 G/DL (ref 3.4–4.8)
ALBUMIN/GLOB SERPL: 1.2 RATIO (ref 1.1–2)
ALP SERPL-CCNC: 65 UNIT/L (ref 40–150)
ALT SERPL-CCNC: 14 UNIT/L (ref 0–55)
AST SERPL-CCNC: 20 UNIT/L (ref 5–34)
BASOPHILS # BLD AUTO: 0.06 X10(3)/MCL (ref 0–0.2)
BASOPHILS NFR BLD AUTO: 0.7 %
BILIRUBIN DIRECT+TOT PNL SERPL-MCNC: 0.8 MG/DL
BUN SERPL-MCNC: 17.3 MG/DL (ref 9.8–20.1)
CALCIUM SERPL-MCNC: 9.5 MG/DL (ref 8.4–10.2)
CHLORIDE SERPL-SCNC: 108 MMOL/L (ref 98–107)
CO2 SERPL-SCNC: 30 MMOL/L (ref 23–31)
CREAT SERPL-MCNC: 1.04 MG/DL (ref 0.55–1.02)
EOSINOPHIL # BLD AUTO: 0.36 X10(3)/MCL (ref 0–0.9)
EOSINOPHIL NFR BLD AUTO: 4 %
ERYTHROCYTE [DISTWIDTH] IN BLOOD BY AUTOMATED COUNT: 13.7 % (ref 11.5–17)
GFR SERPLBLD CREATININE-BSD FMLA CKD-EPI: 55 MLS/MIN/1.73/M2
GLOBULIN SER-MCNC: 3.1 GM/DL (ref 2.4–3.5)
GLUCOSE SERPL-MCNC: 115 MG/DL (ref 82–115)
HCT VFR BLD AUTO: 39.9 % (ref 37–47)
HGB BLD-MCNC: 12.3 G/DL (ref 12–16)
IMM GRANULOCYTES # BLD AUTO: 0.05 X10(3)/MCL (ref 0–0.04)
IMM GRANULOCYTES NFR BLD AUTO: 0.6 %
LYMPHOCYTES # BLD AUTO: 1.95 X10(3)/MCL (ref 0.6–4.6)
LYMPHOCYTES NFR BLD AUTO: 21.6 %
MCH RBC QN AUTO: 26.7 PG
MCHC RBC AUTO-ENTMCNC: 30.8 G/DL (ref 33–36)
MCV RBC AUTO: 86.6 FL (ref 80–94)
MONOCYTES # BLD AUTO: 0.8 X10(3)/MCL (ref 0.1–1.3)
MONOCYTES NFR BLD AUTO: 8.8 %
NEUTROPHILS # BLD AUTO: 5.82 X10(3)/MCL (ref 2.1–9.2)
NEUTROPHILS NFR BLD AUTO: 64.3 %
PLATELET # BLD AUTO: 227 X10(3)/MCL (ref 130–400)
PMV BLD AUTO: 9.6 FL (ref 7.4–10.4)
POTASSIUM SERPL-SCNC: 5.8 MMOL/L (ref 3.5–5.1)
PROT SERPL-MCNC: 6.7 GM/DL (ref 5.8–7.6)
RBC # BLD AUTO: 4.61 X10(6)/MCL (ref 4.2–5.4)
SODIUM SERPL-SCNC: 143 MMOL/L (ref 136–145)
WBC # SPEC AUTO: 9 X10(3)/MCL (ref 4.5–11.5)

## 2023-03-06 PROCEDURE — 3075F PR MOST RECENT SYSTOLIC BLOOD PRESS GE 130-139MM HG: ICD-10-PCS | Mod: CPTII,S$GLB,, | Performed by: NURSE PRACTITIONER

## 2023-03-06 PROCEDURE — 1160F RVW MEDS BY RX/DR IN RCRD: CPT | Mod: CPTII,S$GLB,, | Performed by: NURSE PRACTITIONER

## 2023-03-06 PROCEDURE — 3075F SYST BP GE 130 - 139MM HG: CPT | Mod: CPTII,S$GLB,, | Performed by: NURSE PRACTITIONER

## 2023-03-06 PROCEDURE — 1126F PR PAIN SEVERITY QUANTIFIED, NO PAIN PRESENT: ICD-10-PCS | Mod: CPTII,S$GLB,, | Performed by: NURSE PRACTITIONER

## 2023-03-06 PROCEDURE — 1126F AMNT PAIN NOTED NONE PRSNT: CPT | Mod: CPTII,S$GLB,, | Performed by: NURSE PRACTITIONER

## 2023-03-06 PROCEDURE — 3288F FALL RISK ASSESSMENT DOCD: CPT | Mod: CPTII,S$GLB,, | Performed by: NURSE PRACTITIONER

## 2023-03-06 PROCEDURE — 3079F DIAST BP 80-89 MM HG: CPT | Mod: CPTII,S$GLB,, | Performed by: NURSE PRACTITIONER

## 2023-03-06 PROCEDURE — 1159F PR MEDICATION LIST DOCUMENTED IN MEDICAL RECORD: ICD-10-PCS | Mod: CPTII,S$GLB,, | Performed by: NURSE PRACTITIONER

## 2023-03-06 PROCEDURE — 1160F PR REVIEW ALL MEDS BY PRESCRIBER/CLIN PHARMACIST DOCUMENTED: ICD-10-PCS | Mod: CPTII,S$GLB,, | Performed by: NURSE PRACTITIONER

## 2023-03-06 PROCEDURE — 85025 COMPLETE CBC W/AUTO DIFF WBC: CPT

## 2023-03-06 PROCEDURE — 1101F PT FALLS ASSESS-DOCD LE1/YR: CPT | Mod: CPTII,S$GLB,, | Performed by: NURSE PRACTITIONER

## 2023-03-06 PROCEDURE — 99214 PR OFFICE/OUTPT VISIT, EST, LEVL IV, 30-39 MIN: ICD-10-PCS | Mod: S$GLB,,, | Performed by: NURSE PRACTITIONER

## 2023-03-06 PROCEDURE — 99999 PR PBB SHADOW E&M-EST. PATIENT-LVL IV: CPT | Mod: PBBFAC,,, | Performed by: NURSE PRACTITIONER

## 2023-03-06 PROCEDURE — 3079F PR MOST RECENT DIASTOLIC BLOOD PRESSURE 80-89 MM HG: ICD-10-PCS | Mod: CPTII,S$GLB,, | Performed by: NURSE PRACTITIONER

## 2023-03-06 PROCEDURE — 36415 COLL VENOUS BLD VENIPUNCTURE: CPT

## 2023-03-06 PROCEDURE — 1101F PR PT FALLS ASSESS DOC 0-1 FALLS W/OUT INJ PAST YR: ICD-10-PCS | Mod: CPTII,S$GLB,, | Performed by: NURSE PRACTITIONER

## 2023-03-06 PROCEDURE — 99999 PR PBB SHADOW E&M-EST. PATIENT-LVL IV: ICD-10-PCS | Mod: PBBFAC,,, | Performed by: NURSE PRACTITIONER

## 2023-03-06 PROCEDURE — 3288F PR FALLS RISK ASSESSMENT DOCUMENTED: ICD-10-PCS | Mod: CPTII,S$GLB,, | Performed by: NURSE PRACTITIONER

## 2023-03-06 PROCEDURE — 1159F MED LIST DOCD IN RCRD: CPT | Mod: CPTII,S$GLB,, | Performed by: NURSE PRACTITIONER

## 2023-03-06 PROCEDURE — 99214 OFFICE O/P EST MOD 30 MIN: CPT | Mod: S$GLB,,, | Performed by: NURSE PRACTITIONER

## 2023-03-06 PROCEDURE — 80053 COMPREHEN METABOLIC PANEL: CPT

## 2023-03-06 NOTE — PROGRESS NOTES
HEMATOLOGY/ONCOLOGY OFFICE CLINIC VISIT    Visit Information:  Dx & staging   Initial Consultation: 2021  Referring Physician: Dr Darling  Other Physicians: Dr Zayas  Code Status: Not addressed    Diagnosis/Problem list:   Stage IA2 [id number] RLL NSCLC. Dx 21    Present Treatment:  Observation    Treatment history:    2021 underwent right lower lobectomy with mediastinal lymph node    Plan of care: Surveillance      Imagin2019 Ct Chest:  RLL superior segment 7 mm groundglass density is identified. A RLL12 mm x 7.9 mm mass is noted with marginal spiculation.  2019 PET/CT: Right lower lobe 8 mm nodule is stable in size and demonstrates only low-grade FDG activity. A follow-up chest CT in 3-6 months would be reasonable to monitor.  2020 CT scan chest: showed a right lower lobe 1 cm spiculated nodule, previously 0.8 cm   3/3/2021 CT Chest: enlarging now 2 cm right lower lobe pulmonary nodule concerning for malignancy.      2022 CT CHEST: Impression: 1. Postoperative changes of right lower lobe nodule resection are again seen.  2. There is a 4.5 mm ground-glass nodule in the anterior left upper lobe which appears grossly stable compared to previous CT dated 2022.  Recommend continued close surveillance with CT chest in 6 months.  3. Additional findings and details as above.     Pathology:  2021: right lower lobectomy with mediastinal lymph node dissection   [1]  LYMPH NODE, 9R, LYMPHADENECTOMY: THREE LYMPH NODE NEGATIVE FOR METASTATIC CARCINOMA (0/3).  [2]  LYMPH NODE, 11R, LYMPHADENECTOMY: ONE LYMPH NODE NEGATIVE FOR METASTATIC CARCINOMA (0/1).  [3]  LYMPH NODE, 4R, LYMPHADENECTOMY: ONE LYMPH NODE NEGATIVE FOR METASTATIC CARCINOMA (0/1).  [4]  LUNG, RIGHT LOWER LOBECTOMY: INVASIVE MODERATELY TO POORLY DIFFERENTIATED SQUAMOUS CELL CARCINOMA, KERATINIZING.       -Histologic Grade:  G3: Poorly differentiated   - TUMOR SIZE:  1.9 CM.   - MARGINS NEGATIVE.   - LYMPHOVASCULAR  INVASION NOT IDENTIFIED.      -TWO LYMPH NODES NEGATIVE FOR METASTATIC CARCINOMA (0/2).  IMMUNOHISTOCHEMICAL STAIN:  p63: Positive. CK7, TTF-1:  Negative.  Primary Tumor (pT):  pT1b, Regional Lymph Nodes (pN):  pN0     CLINICAL HISTORY:       Patient: 77 year old female kindly referred for lung cancer.  Patient with a 50-pack-year history (used to smoke 3 ppd) but stopped smoking about 30 years ago. She was follow closely for pulmonary nodule.  On 2020 CT scan of the chest showed a right lower lobe 1 cm spiculated nodule, previously 0.8 cm on 2019.  CT was repeated on 3/3/2021 that revealed an enlarging now 2 cm right lower lobe pulmonary nodule concerning for malignancy.    Patient is here today with her .  She denies any fever, chills, sweats.  No chest pain or shortness of breath.  She still have some tenderness in the area of surgery, that is well-healed.  No headaches or changes in vision.  No changes in bowel habits.    Patient have a sister that  of pancreatic cancer.  Brother and father both had cancer that spread to the bones.  No family history of lung cancer or other malignancies.       Chief Complaint: No Concerns today        Interval History:  Patient presents today for follow up in surveillance of her lung cancer. She is with her . Reports that she is feeling well. Denies fever, chills or sweats. No chest pain or shortness of breath. No abnormal bleeding.    CT Thorax 23:  Impression: No new suspicious findings.  Stable chest CT.       ROS:  All 14 points ROS taken and positive as per Interval History, all other negative.    Histories:  PMH/PSH/FH/SOCIAL/ALLERGIES AND MEDS REVIEWED AND UPDATED AS APPROPRIATE       Vitals:    23 0943   BP: 134/81   BP Location: Left arm   Patient Position: Sitting   Pulse: 70   Temp: 97.9 °F (36.6 °C)   TempSrc: Oral   SpO2: 99%   Weight: 74.8 kg (165 lb)      Physical Exam  Vitals and nursing note reviewed.   Constitutional:        General: She is not in acute distress.     Appearance: Normal appearance. She is well-developed.   HENT:      Head: Normocephalic and atraumatic.      Mouth/Throat:      Mouth: Mucous membranes are moist.   Eyes:      General: No scleral icterus.     Extraocular Movements: Extraocular movements intact.      Conjunctiva/sclera: Conjunctivae normal.      Pupils: Pupils are equal, round, and reactive to light.   Neck:      Vascular: No JVD.   Cardiovascular:      Rate and Rhythm: Normal rate and regular rhythm.      Heart sounds: No murmur heard.  Pulmonary:      Effort: Pulmonary effort is normal.      Breath sounds: Normal breath sounds. No wheezing or rhonchi.   Abdominal:      General: Bowel sounds are normal. There is no distension.      Palpations: Abdomen is soft. There is no mass.      Tenderness: There is no abdominal tenderness.   Musculoskeletal:         General: No swelling or deformity.      Cervical back: Neck supple.   Lymphadenopathy:      Cervical: No cervical adenopathy.      Lower Body: No right inguinal adenopathy. No left inguinal adenopathy.   Skin:     General: Skin is warm.      Coloration: Skin is not jaundiced.      Findings: No lesion or rash.      Nails: There is no clubbing.   Neurological:      General: No focal deficit present.      Mental Status: She is alert and oriented to person, place, and time.      Sensory: Sensation is intact.      Motor: Motor function is intact.      Gait: Gait is intact.   Psychiatric:         Attention and Perception: Attention normal.         Mood and Affect: Mood and affect normal.         Speech: Speech normal.         Behavior: Behavior is cooperative.         Thought Content: Thought content normal.         Cognition and Memory: Cognition normal.         Judgment: Judgment normal.     ECOG SCORE    0 - Fully active-able to carry on all pre-disease performance without restriction         Laboratory:  CBC with Differential:  Lab Results   Component Value  Date    WBC 9.0 03/06/2023    RBC 4.61 03/06/2023    HGB 12.3 03/06/2023    HCT 39.9 03/06/2023    MCV 86.6 03/06/2023    MCH 26.7 03/06/2023    MCHC 30.8 (L) 03/06/2023    RDW 13.7 03/06/2023     03/06/2023    MPV 9.6 03/06/2023        CMP:  Sodium Level   Date Value Ref Range Status   12/05/2022 143 136 - 145 mmol/L Final     Potassium Level   Date Value Ref Range Status   12/05/2022 4.6 3.5 - 5.1 mmol/L Final     Carbon Dioxide   Date Value Ref Range Status   12/05/2022 29 23 - 31 mmol/L Final     Blood Urea Nitrogen   Date Value Ref Range Status   12/05/2022 18.7 9.8 - 20.1 mg/dL Final     Creatinine   Date Value Ref Range Status   12/05/2022 1.06 (H) 0.55 - 1.02 mg/dL Final     Calcium Level Total   Date Value Ref Range Status   12/05/2022 9.7 8.4 - 10.2 mg/dL Final     Albumin Level   Date Value Ref Range Status   12/05/2022 3.9 3.4 - 4.8 gm/dL Final     Bilirubin Total   Date Value Ref Range Status   12/05/2022 0.8 <=1.5 mg/dL Final     Alkaline Phosphatase   Date Value Ref Range Status   12/05/2022 70 40 - 150 unit/L Final     Aspartate Aminotransferase   Date Value Ref Range Status   12/05/2022 25 5 - 34 unit/L Final     Alanine Aminotransferase   Date Value Ref Range Status   12/05/2022 20 0 - 55 unit/L Final     Estimated GFR-Non    Date Value Ref Range Status   05/26/2022 50 mls/min/1.73/m2 Final         Assessment:       1. Malignant neoplasm of unspecified part of unspecified bronchus or lung        Stage IA2 [id number] RLL NSCLC Dx 4/5/2021   --Mod-poorly differentiated squamous cell carcinoma   --Tumor 1.9 cm, no lymphovascular invasion identify, visceral pleural invasion not identify, margins clear   --0/7 LN pos  NCCN guidelines surveillance:   --for stage I (F5slpC9)--> margins negative (R0)--> observation (NSCL-4)   --for surveillance after completion of definitive therapy--> H&P and chest CTq6 months x 2-3 yrs, then H&P + low-dose noncontrast enhanced CT annually  (NSCL-16)        Plan:     Patient diagnosed with early stage (1A2) non-small cell lung carcinoma, margins negative in April 2021.    No clinical or radiographic evidence of recurrence.   She is now 2 years out from her lung cancer diagnosis so we will extend her visit to q6 months.   RTC 6 months or earlier if needed with CBC, CMP after CT chest- with MD for results.      Encouraged to call with questions or problems  The patient was given ample opportunity to ask questions and they were all answered to satisfaction; patient demonstrated understanding of what we discussed and is agreeable to plan.    ANTONIO Bean

## 2023-08-02 ENCOUNTER — OFFICE VISIT (OUTPATIENT)
Dept: URGENT CARE | Facility: CLINIC | Age: 78
End: 2023-08-02
Payer: MEDICARE

## 2023-08-02 VITALS
TEMPERATURE: 98 F | WEIGHT: 165 LBS | HEIGHT: 62 IN | DIASTOLIC BLOOD PRESSURE: 72 MMHG | SYSTOLIC BLOOD PRESSURE: 113 MMHG | OXYGEN SATURATION: 95 % | BODY MASS INDEX: 30.36 KG/M2 | HEART RATE: 90 BPM | RESPIRATION RATE: 20 BRPM

## 2023-08-02 DIAGNOSIS — R05.9 COUGH, UNSPECIFIED TYPE: ICD-10-CM

## 2023-08-02 DIAGNOSIS — U07.1 COVID-19 VIRUS DETECTED: ICD-10-CM

## 2023-08-02 DIAGNOSIS — U07.1 COVID-19: Primary | ICD-10-CM

## 2023-08-02 LAB
CTP QC/QA: YES
SARS-COV-2 RDRP RESP QL NAA+PROBE: POSITIVE

## 2023-08-02 PROCEDURE — 87635: ICD-10-PCS | Mod: QW,,,

## 2023-08-02 PROCEDURE — 87635 SARS-COV-2 COVID-19 AMP PRB: CPT | Mod: QW,,,

## 2023-08-02 PROCEDURE — 99214 OFFICE O/P EST MOD 30 MIN: CPT | Mod: S$GLB,,,

## 2023-08-02 PROCEDURE — 99214 PR OFFICE/OUTPT VISIT, EST, LEVL IV, 30-39 MIN: ICD-10-PCS | Mod: S$GLB,,,

## 2023-08-02 RX ORDER — PROMETHAZINE HYDROCHLORIDE AND DEXTROMETHORPHAN HYDROBROMIDE 6.25; 15 MG/5ML; MG/5ML
5 SYRUP ORAL EVERY 6 HOURS PRN
Qty: 180 ML | Refills: 0 | Status: SHIPPED | OUTPATIENT
Start: 2023-08-02 | End: 2023-08-12

## 2023-08-02 NOTE — PATIENT INSTRUCTIONS
COVID-Take Tylenol (acetaminophen) for fever/aches.  Drink plenty of fluids.     Molnupiravir (Lagevrio) is a treatment issued with COVID with-in 5 days of symptom onset to decrease risk of hospitalization and lessen symptom impact. Side effects from this treatment are very rare but may include nausea and diarrhea in 2% of patients.     Take OTC cough/cold/congestion medication such as Dayquil/Nyquil.    May also take antihistamine such as Claritin/Zyrtec/Allegra.  Cepacol sore throat lozenges if needed.     Drink plenty of fluids.  Rest.     Go directly to ER if you experience any SOB, chest pain, or other worrisome symptoms.      If positive for Covid-19, you should stay in isolation until: 1) At least 5 days have passed since your symptoms first appeared and  2) At least 24 hours have passed since recovery defined as resolution of fever without the use of fever-reducing medications and improvement in symptoms

## 2023-08-02 NOTE — PROGRESS NOTES
"Subjective:      Patient ID: Amanda Colmenares is a 78 y.o. female.    Vitals:  height is 5' 2" (1.575 m) and weight is 74.8 kg (165 lb). Her oral temperature is 98.1 °F (36.7 °C). Her blood pressure is 113/72 and her pulse is 90. Her respiration is 20 and oxygen saturation is 95%.     Chief Complaint: Cough (Cough x 1 day. pt exposed to covid.)    Patient is a 78-year-old female with a history of diabetes, high cholesterol, hypertension that presents complaining of coughing all night.  States she has been taking her 's cough medicine that has codeine in it and it has helped.    She denies any other symptoms at this time but does state that her  is positive for COVID. Patient denies any SOB, CP, rash, n/v/d, or neck stiffness.          Respiratory:  Positive for cough.       Objective:     Physical Exam   Constitutional: She is oriented to person, place, and time.  Non-toxic appearance. She does not appear ill.   HENT:   Ears:   Right Ear: Tympanic membrane, external ear and ear canal normal.   Left Ear: Tympanic membrane, external ear and ear canal normal.   Nose: Nose normal.   Mouth/Throat: Mucous membranes are moist. No tonsillar exudate. Oropharynx is clear.   Cardiovascular: Normal rate, regular rhythm, normal heart sounds and normal pulses.   Pulmonary/Chest: Effort normal and breath sounds normal.   Abdominal: Normal appearance and bowel sounds are normal. Soft. There is no abdominal tenderness.   Musculoskeletal: Normal range of motion.         General: Normal range of motion.   Neurological: She is alert and oriented to person, place, and time.   Skin: Skin is warm and dry. Capillary refill takes less than 2 seconds.   Psychiatric: Her behavior is normal. Mood normal.       Assessment:     1. COVID-19    2. Cough, unspecified type           Previous History      Review of patient's allergies indicates:   Allergen Reactions    Aspirin-caffeine Other (See Comments)    Morphine Other (See Comments) " "   Oxycodone-acetaminophen Rash       Past Medical History:   Diagnosis Date    Arthritis     Diabetes mellitus     Hypercholesterolemia     Hypertension      Current Outpatient Medications   Medication Instructions    enalapril (VASOTEC) 2.5 MG tablet No dose, route, or frequency recorded.    ezetimibe-simvastatin 10-80 mg (VYTORIN) 10-80 mg per tablet No dose, route, or frequency recorded.    glipiZIDE (GLUCOTROL) 10 MG tablet No dose, route, or frequency recorded.    LIDOcaine (LIDODERM) 5 % 1 patch, Transdermal, Daily, Remove & Discard patch within 12 hours or as directed by MD    metFORMIN (GLUCOPHAGE) 1000 MG tablet No dose, route, or frequency recorded.    mirtazapine (REMERON) 15 MG tablet No dose, route, or frequency recorded.    molnupiravir 800 mg, Oral, Every 12 hours    promethazine-dextromethorphan (PROMETHAZINE-DM) 6.25-15 mg/5 mL Syrp 5 mLs, Oral, Every 6 hours PRN    TRUE METRIX GLUCOSE METER kit No dose, route, or frequency recorded.    TRUE METRIX GLUCOSE TEST STRIP Strp No dose, route, or frequency recorded.    TRUEPLUS LANCETS 33 gauge Misc No dose, route, or frequency recorded.    valACYclovir (VALTREX) 1,000 mg, Oral, 3 times daily     Past Surgical History:   Procedure Laterality Date    APPENDECTOMY      COLONOSCOPY      EYE SURGERY      HAND SURGERY      HYSTERECTOMY      KNEE SURGERY Bilateral     TONSILLECTOMY       Family History   Problem Relation Age of Onset    Diabetes Mother     Bone cancer Father     Cancer Sister     Heart failure Sister     Cancer Brother        Social History     Tobacco Use    Smoking status: Never    Smokeless tobacco: Never   Substance Use Topics    Alcohol use: Never    Drug use: Never        Physical Exam      Vital Signs Reviewed   /72 (BP Location: Right arm)   Pulse 90   Temp 98.1 °F (36.7 °C) (Oral)   Resp 20   Ht 5' 2" (1.575 m)   Wt 74.8 kg (165 lb)   LMP  (LMP Unknown)   SpO2 95%   BMI 30.18 kg/m²        Procedures    Procedures     " Labs     Results for orders placed or performed in visit on 08/02/23   POCT COVID-19 Rapid Screening   Result Value Ref Range    POC Rapid COVID Positive (A) Negative     Acceptable Yes       Plan:       COVID-19  -     molnupiravir 200 mg capsule (EUA); Take 4 capsules (800 mg total) by mouth every 12 (twelve) hours. for 5 days  Dispense: 40 capsule; Refill: 0    Cough, unspecified type  -     POCT COVID-19 Rapid Screening  -     promethazine-dextromethorphan (PROMETHAZINE-DM) 6.25-15 mg/5 mL Syrp; Take 5 mLs by mouth every 6 (six) hours as needed (cough).  Dispense: 180 mL; Refill: 0      COVID-Take Tylenol (acetaminophen) for fever/aches.  Drink plenty of fluids.     Molnupiravir (Lagevrio) is a treatment issued with COVID with-in 5 days of symptom onset to decrease risk of hospitalization and lessen symptom impact. Side effects from this treatment are very rare but may include nausea and diarrhea in 2% of patients.     Take OTC cough/cold/congestion medication such as Dayquil/Nyquil.    May also take antihistamine such as Claritin/Zyrtec/Allegra.  Cepacol sore throat lozenges if needed.     Drink plenty of fluids.  Rest.     Go directly to ER if you experience any SOB, chest pain, or other worrisome symptoms.      If positive for Covid-19, you should stay in isolation until: 1) At least 5 days have passed since your symptoms first appeared and  2) At least 24 hours have passed since recovery defined as resolution of fever without the use of fever-reducing medications and improvement in symptoms

## 2023-09-20 ENCOUNTER — HOSPITAL ENCOUNTER (OUTPATIENT)
Dept: RADIOLOGY | Facility: HOSPITAL | Age: 78
Discharge: HOME | End: 2023-09-20
Attending: NURSE PRACTITIONER
Payer: MEDICARE

## 2023-09-20 DIAGNOSIS — C34.90 MALIGNANT NEOPLASM OF UNSPECIFIED PART OF UNSPECIFIED BRONCHUS OR LUNG: ICD-10-CM

## 2023-09-20 LAB
CREAT SERPL-MCNC: 1.1 MG/DL (ref 0.5–1.4)
SAMPLE: NORMAL

## 2023-09-20 PROCEDURE — 71260 CT THORAX DX C+: CPT | Mod: TC

## 2023-09-20 PROCEDURE — 25500020 PHARM REV CODE 255: Performed by: NURSE PRACTITIONER

## 2023-09-20 RX ADMIN — IOPAMIDOL 100 ML: 755 INJECTION, SOLUTION INTRAVENOUS at 09:09

## 2023-09-25 ENCOUNTER — OFFICE VISIT (OUTPATIENT)
Dept: HEMATOLOGY/ONCOLOGY | Facility: CLINIC | Age: 78
End: 2023-09-25
Payer: MEDICARE

## 2023-09-25 VITALS
WEIGHT: 162 LBS | RESPIRATION RATE: 17 BRPM | TEMPERATURE: 98 F | OXYGEN SATURATION: 97 % | BODY MASS INDEX: 29.81 KG/M2 | HEIGHT: 62 IN | DIASTOLIC BLOOD PRESSURE: 82 MMHG | HEART RATE: 68 BPM | SYSTOLIC BLOOD PRESSURE: 149 MMHG

## 2023-09-25 DIAGNOSIS — C34.31 SQUAMOUS CELL CARCINOMA OF BRONCHUS IN RIGHT LOWER LOBE: Primary | ICD-10-CM

## 2023-09-25 PROCEDURE — 3077F SYST BP >= 140 MM HG: CPT | Mod: CPTII,S$GLB,, | Performed by: INTERNAL MEDICINE

## 2023-09-25 PROCEDURE — 99214 OFFICE O/P EST MOD 30 MIN: CPT | Mod: S$GLB,,, | Performed by: INTERNAL MEDICINE

## 2023-09-25 PROCEDURE — 3288F FALL RISK ASSESSMENT DOCD: CPT | Mod: CPTII,S$GLB,, | Performed by: INTERNAL MEDICINE

## 2023-09-25 PROCEDURE — 1159F PR MEDICATION LIST DOCUMENTED IN MEDICAL RECORD: ICD-10-PCS | Mod: CPTII,S$GLB,, | Performed by: INTERNAL MEDICINE

## 2023-09-25 PROCEDURE — 1126F AMNT PAIN NOTED NONE PRSNT: CPT | Mod: CPTII,S$GLB,, | Performed by: INTERNAL MEDICINE

## 2023-09-25 PROCEDURE — 3288F PR FALLS RISK ASSESSMENT DOCUMENTED: ICD-10-PCS | Mod: CPTII,S$GLB,, | Performed by: INTERNAL MEDICINE

## 2023-09-25 PROCEDURE — 1101F PR PT FALLS ASSESS DOC 0-1 FALLS W/OUT INJ PAST YR: ICD-10-PCS | Mod: CPTII,S$GLB,, | Performed by: INTERNAL MEDICINE

## 2023-09-25 PROCEDURE — 99214 PR OFFICE/OUTPT VISIT, EST, LEVL IV, 30-39 MIN: ICD-10-PCS | Mod: S$GLB,,, | Performed by: INTERNAL MEDICINE

## 2023-09-25 PROCEDURE — 99999 PR PBB SHADOW E&M-EST. PATIENT-LVL IV: CPT | Mod: PBBFAC,,, | Performed by: INTERNAL MEDICINE

## 2023-09-25 PROCEDURE — 99999 PR PBB SHADOW E&M-EST. PATIENT-LVL IV: ICD-10-PCS | Mod: PBBFAC,,, | Performed by: INTERNAL MEDICINE

## 2023-09-25 PROCEDURE — 3079F PR MOST RECENT DIASTOLIC BLOOD PRESSURE 80-89 MM HG: ICD-10-PCS | Mod: CPTII,S$GLB,, | Performed by: INTERNAL MEDICINE

## 2023-09-25 PROCEDURE — 1126F PR PAIN SEVERITY QUANTIFIED, NO PAIN PRESENT: ICD-10-PCS | Mod: CPTII,S$GLB,, | Performed by: INTERNAL MEDICINE

## 2023-09-25 PROCEDURE — 1101F PT FALLS ASSESS-DOCD LE1/YR: CPT | Mod: CPTII,S$GLB,, | Performed by: INTERNAL MEDICINE

## 2023-09-25 PROCEDURE — 1159F MED LIST DOCD IN RCRD: CPT | Mod: CPTII,S$GLB,, | Performed by: INTERNAL MEDICINE

## 2023-09-25 PROCEDURE — 3079F DIAST BP 80-89 MM HG: CPT | Mod: CPTII,S$GLB,, | Performed by: INTERNAL MEDICINE

## 2023-09-25 PROCEDURE — 3077F PR MOST RECENT SYSTOLIC BLOOD PRESSURE >= 140 MM HG: ICD-10-PCS | Mod: CPTII,S$GLB,, | Performed by: INTERNAL MEDICINE

## 2023-09-25 NOTE — PROGRESS NOTES
HEMATOLOGY/ONCOLOGY OFFICE CLINIC VISIT    Visit Information:  Dx & staging   Initial Consultation: 2021  Referring Physician: Dr Darling  Other Physicians: Dr Zayas  Code Status: Not addressed    Diagnosis/Problem list:   Stage IA2 [id number] RLL NSCLC. Dx 21    Present Treatment:  Observation    Treatment history:    2021 underwent right lower lobectomy with mediastinal lymph node    Plan of care: Surveillance      Imagin2019 Ct Chest:  RLL superior segment 7 mm groundglass density is identified. A RLL12 mm x 7.9 mm mass is noted with marginal spiculation.  2019 PET/CT: Right lower lobe 8 mm nodule is stable in size and demonstrates only low-grade FDG activity. A follow-up chest CT in 3-6 months would be reasonable to monitor.  2020 CT scan chest: showed a right lower lobe 1 cm spiculated nodule, previously 0.8 cm   3/3/2021 CT Chest: enlarging now 2 cm right lower lobe pulmonary nodule concerning for malignancy.      2022 CT CHEST: Impression: 1. Postoperative changes of right lower lobe nodule resection are again seen.  2. There is a 4.5 mm ground-glass nodule in the anterior left upper lobe which appears grossly stable compared to previous CT       dated 2022.  Recommend continued close surveillance with CT chest in 6 months.  3. Additional findings and details as above.       CT Chest: No acute intrathoracic process.  No evidence of malignancy to the chest.  Surgical changes consistent with partial lung resection on the right.  Stable left subcentimeter adrenal nodule.     Pathology:  2021: right lower lobectomy with mediastinal lymph node dissection   [1]  LYMPH NODE, 9R, LYMPHADENECTOMY: THREE LYMPH NODE NEGATIVE FOR METASTATIC CARCINOMA (0/3).  [2]  LYMPH NODE, 11R, LYMPHADENECTOMY: ONE LYMPH NODE NEGATIVE FOR METASTATIC CARCINOMA (0/1).  [3]  LYMPH NODE, 4R, LYMPHADENECTOMY: ONE LYMPH NODE NEGATIVE FOR METASTATIC CARCINOMA (0/1).  [4]  LUNG, RIGHT  LOWER LOBECTOMY: INVASIVE MODERATELY TO POORLY DIFFERENTIATED SQUAMOUS CELL CARCINOMA, KERATINIZING.       -Histologic Grade:  G3: Poorly differentiated   - TUMOR SIZE:  1.9 CM.   - MARGINS NEGATIVE.   - LYMPHOVASCULAR INVASION NOT IDENTIFIED.      -TWO LYMPH NODES NEGATIVE FOR METASTATIC CARCINOMA (0/2).  IMMUNOHISTOCHEMICAL STAIN:  p63: Positive. CK7, TTF-1:  Negative.  Primary Tumor (pT):  pT1b, Regional Lymph Nodes (pN):  pN0     CLINICAL HISTORY:       Patient: 77 year old female kindly referred for lung cancer.  Patient with a 50-pack-year history (used to smoke 3 ppd) but stopped smoking about 30 years ago. She was follow closely for pulmonary nodule.  On 2020 CT scan of the chest showed a right lower lobe 1 cm spiculated nodule, previously 0.8 cm on 2019.  CT was repeated on 3/3/2021 that revealed an enlarging now 2 cm right lower lobe pulmonary nodule concerning for malignancy.    Patient is here today with her .  She denies any fever, chills, sweats.  No chest pain or shortness of breath.  She still have some tenderness in the area of surgery, that is well-healed.  No headaches or changes in vision.  No changes in bowel habits.    Patient have a sister that  of pancreatic cancer.  Brother and father both had cancer that spread to the bones.  No family history of lung cancer or other malignancies.       Chief Complaint: OTHER (No concerns today.)        Interval History:  Patient presents today for 6 month follow up in surveillance of her lung cancer. She is with her significant other. She reports coughing up phlegm  in the morning. Overall, she says she is  feeling good. Denies fever, chills or sweats. No chest pain or shortness of breath. No abnormal bleeding. Quit 40 years ago.      ROS:  All 14 points ROS taken and positive as per Interval History, all other negative.    Histories:  PMH/PSH/FH/SOCIAL/ALLERGIES AND MEDS REVIEWED AND UPDATED AS APPROPRIATE       Vitals:    23  "1125   BP: (!) 149/82   BP Location: Right arm   Patient Position: Sitting   Pulse: 68   Resp: 17   Temp: 97.6 °F (36.4 °C)   TempSrc: Oral   SpO2: 97%   Weight: 73.5 kg (162 lb)   Height: 5' 2" (1.575 m)        Physical Exam  Vitals and nursing note reviewed.   Constitutional:       General: She is not in acute distress.     Appearance: Normal appearance. She is well-developed.   HENT:      Head: Normocephalic and atraumatic.      Mouth/Throat:      Mouth: Mucous membranes are moist.   Eyes:      General: No scleral icterus.     Extraocular Movements: Extraocular movements intact.      Conjunctiva/sclera: Conjunctivae normal.      Pupils: Pupils are equal, round, and reactive to light.   Neck:      Vascular: No JVD.   Cardiovascular:      Rate and Rhythm: Normal rate and regular rhythm.      Heart sounds: No murmur heard.  Pulmonary:      Effort: Pulmonary effort is normal.      Breath sounds: Normal breath sounds. No wheezing or rhonchi.   Abdominal:      General: Bowel sounds are normal. There is no distension.      Palpations: Abdomen is soft. There is no mass.      Tenderness: There is no abdominal tenderness.   Musculoskeletal:         General: No swelling or deformity.      Cervical back: Neck supple.   Lymphadenopathy:      Cervical: No cervical adenopathy.      Lower Body: No right inguinal adenopathy. No left inguinal adenopathy.   Skin:     General: Skin is warm.      Coloration: Skin is not jaundiced.      Findings: No lesion or rash.      Nails: There is no clubbing.   Neurological:      General: No focal deficit present.      Mental Status: She is alert and oriented to person, place, and time.      Sensory: Sensation is intact.      Motor: Motor function is intact.      Gait: Gait is intact.   Psychiatric:         Attention and Perception: Attention normal.         Mood and Affect: Mood and affect normal.         Speech: Speech normal.         Behavior: Behavior is cooperative.         Thought Content: " Thought content normal.         Cognition and Memory: Cognition normal.         Judgment: Judgment normal.       ECOG SCORE             Laboratory:  CBC with Differential:  Lab Results   Component Value Date    WBC 7.98 09/25/2023    RBC 4.98 09/25/2023    HGB 12.8 09/25/2023    HCT 42.0 09/25/2023    MCV 84.3 09/25/2023    MCH 25.7 (L) 09/25/2023    MCHC 30.5 (L) 09/25/2023    RDW 13.0 09/25/2023     09/25/2023    MPV 9.6 09/25/2023        CMP:  Sodium Level   Date Value Ref Range Status   09/25/2023 145 136 - 145 mmol/L Final     Potassium Level   Date Value Ref Range Status   09/25/2023 4.8 3.5 - 5.1 mmol/L Final     Carbon Dioxide   Date Value Ref Range Status   09/25/2023 29 23 - 31 mmol/L Final     Blood Urea Nitrogen   Date Value Ref Range Status   09/25/2023 16.9 9.8 - 20.1 mg/dL Final     Creatinine   Date Value Ref Range Status   09/25/2023 0.97 0.55 - 1.02 mg/dL Final     Calcium Level Total   Date Value Ref Range Status   09/25/2023 9.5 8.4 - 10.2 mg/dL Final     Albumin Level   Date Value Ref Range Status   09/25/2023 3.7 3.4 - 4.8 g/dL Final     Bilirubin Total   Date Value Ref Range Status   09/25/2023 0.8 <=1.5 mg/dL Final     Alkaline Phosphatase   Date Value Ref Range Status   09/25/2023 69 40 - 150 unit/L Final     Aspartate Aminotransferase   Date Value Ref Range Status   09/25/2023 20 5 - 34 unit/L Final     Alanine Aminotransferase   Date Value Ref Range Status   09/25/2023 13 0 - 55 unit/L Final     Estimated GFR-Non    Date Value Ref Range Status   05/26/2022 50 mls/min/1.73/m2 Final         Assessment:       1. 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      Stage IA2 [id number] RLL NSCLC Dx 4/5/2021   --Mod-poorly differentiated squamous cell carcinoma   --Tumor 1.9 cm, no lymphovascular invasion identify, visceral pleural invasion not identify, margins clear   --0/7 LN pos  NCCN guidelines surveillance:   --for stage I (D2ussU5)--> margins negative (R0)-->  observation (NSCL-4)   --for surveillance after completion of definitive therapy--> H&P and chest CTq6 months x 2-3 yrs, then H&P + low-dose noncontrast enhanced CT annually (NSCL-16)        Plan:     Patient diagnosed with early stage (1A2) non-small cell lung carcinoma, margins negative in April 2021.  No clinical or radiographic evidence of recurrence. Will continue surveillance. She is now 2 years out from her lung cancer diagnosis so we will extend her visit to q 6 months.     RTC 6 months or earlier if needed with same day labs: CBC, CMP  CT Chest with contrast      Encouraged to call with questions or problems  The patient was given ample opportunity to ask questions and they were all answered to satisfaction; patient demonstrated understanding of what we discussed and is agreeable to plan.    Cielo Aguiar MD  Hematology/Oncology      Professional Services   I, Melissa Judge LPN, acted solely as a scribe for and in the presence of Dr. Cielo Aguiar, who performed these services.

## 2024-03-18 ENCOUNTER — HOSPITAL ENCOUNTER (OUTPATIENT)
Dept: RADIOLOGY | Facility: HOSPITAL | Age: 79
Discharge: HOME | End: 2024-03-18
Attending: NURSE PRACTITIONER
Payer: MEDICARE

## 2024-03-18 DIAGNOSIS — C34.31 SQUAMOUS CELL CARCINOMA OF BRONCHUS IN RIGHT LOWER LOBE: ICD-10-CM

## 2024-03-18 LAB
CREAT SERPL-MCNC: 1.1 MG/DL (ref 0.5–1.4)
SAMPLE: NORMAL

## 2024-03-18 PROCEDURE — 71260 CT THORAX DX C+: CPT | Mod: TC

## 2024-03-18 PROCEDURE — 25500020 PHARM REV CODE 255: Performed by: NURSE PRACTITIONER

## 2024-03-18 RX ADMIN — IOPAMIDOL 100 ML: 755 INJECTION, SOLUTION INTRAVENOUS at 11:03

## 2024-03-25 ENCOUNTER — OFFICE VISIT (OUTPATIENT)
Dept: HEMATOLOGY/ONCOLOGY | Facility: CLINIC | Age: 79
End: 2024-03-25
Payer: MEDICARE

## 2024-03-25 VITALS
RESPIRATION RATE: 20 BRPM | BODY MASS INDEX: 29.87 KG/M2 | HEART RATE: 65 BPM | TEMPERATURE: 98 F | DIASTOLIC BLOOD PRESSURE: 75 MMHG | OXYGEN SATURATION: 95 % | WEIGHT: 162.31 LBS | HEIGHT: 62 IN | SYSTOLIC BLOOD PRESSURE: 116 MMHG

## 2024-03-25 DIAGNOSIS — C34.31 SQUAMOUS CELL CARCINOMA OF BRONCHUS IN RIGHT LOWER LOBE: Primary | ICD-10-CM

## 2024-03-25 PROCEDURE — 99214 OFFICE O/P EST MOD 30 MIN: CPT | Mod: S$GLB,,, | Performed by: INTERNAL MEDICINE

## 2024-03-25 PROCEDURE — 3078F DIAST BP <80 MM HG: CPT | Mod: CPTII,S$GLB,, | Performed by: INTERNAL MEDICINE

## 2024-03-25 PROCEDURE — 3288F FALL RISK ASSESSMENT DOCD: CPT | Mod: CPTII,S$GLB,, | Performed by: INTERNAL MEDICINE

## 2024-03-25 PROCEDURE — 3074F SYST BP LT 130 MM HG: CPT | Mod: CPTII,S$GLB,, | Performed by: INTERNAL MEDICINE

## 2024-03-25 PROCEDURE — 1160F RVW MEDS BY RX/DR IN RCRD: CPT | Mod: CPTII,S$GLB,, | Performed by: INTERNAL MEDICINE

## 2024-03-25 PROCEDURE — 1159F MED LIST DOCD IN RCRD: CPT | Mod: CPTII,S$GLB,, | Performed by: INTERNAL MEDICINE

## 2024-03-25 PROCEDURE — 1101F PT FALLS ASSESS-DOCD LE1/YR: CPT | Mod: CPTII,S$GLB,, | Performed by: INTERNAL MEDICINE

## 2024-03-25 PROCEDURE — 99999 PR PBB SHADOW E&M-EST. PATIENT-LVL III: CPT | Mod: PBBFAC,,, | Performed by: INTERNAL MEDICINE

## 2024-03-25 PROCEDURE — 1126F AMNT PAIN NOTED NONE PRSNT: CPT | Mod: CPTII,S$GLB,, | Performed by: INTERNAL MEDICINE

## 2024-03-25 NOTE — PROGRESS NOTES
HEMATOLOGY/ONCOLOGY OFFICE CLINIC VISIT    Visit Information:  Dx & staging   Initial Consultation: 2021  Referring Physician: Dr Darling  Other Physicians: Dr Zayas  Code Status: Not addressed    Diagnosis/Problem list:   Stage IA2 [id number] RLL NSCLC. Dx 21    Present Treatment:  Observation    Treatment history:    2021 underwent right lower lobectomy with mediastinal lymph node    Plan of care: Surveillance      Imagin2019 Ct Chest:  RLL superior segment 7 mm groundglass density is identified. A RLL12 mm x 7.9 mm mass is noted with marginal spiculation.  2019 PET/CT: Right lower lobe 8 mm nodule is stable in size and demonstrates only low-grade FDG activity. A follow-up chest CT in 3-6 months would be reasonable to monitor.  2020 CT scan chest: showed a right lower lobe 1 cm spiculated nodule, previously 0.8 cm   3/3/2021 CT Chest: enlarging now 2 cm right lower lobe pulmonary nodule concerning for malignancy.      2022 CT CHEST: Impression: 1. Postoperative changes of right lower lobe nodule resection are again seen.  2. There is a 4.5 mm ground-glass nodule in the anterior left upper lobe which appears grossly stable compared to previous CT       dated 2022.  Recommend continued close surveillance with CT chest in 6 months.  3. Additional findings and details as above.       CT Chest: No acute intrathoracic process.  No evidence of malignancy to the chest.  Surgical changes consistent with partial lung resection on the right.  Stable left subcentimeter adrenal nodule.      3/18/2024 CT Chest:  No detrimental change identified since 2023.     Pathology:  2021: right lower lobectomy with mediastinal lymph node dissection   [1]  LYMPH NODE, 9R, LYMPHADENECTOMY: THREE LYMPH NODE NEGATIVE FOR METASTATIC CARCINOMA (0/3).  [2]  LYMPH NODE, 11R, LYMPHADENECTOMY: ONE LYMPH NODE NEGATIVE FOR METASTATIC CARCINOMA (0/1).  [3]  LYMPH NODE, 4R, LYMPHADENECTOMY:  ONE LYMPH NODE NEGATIVE FOR METASTATIC CARCINOMA (0/1).  [4]  LUNG, RIGHT LOWER LOBECTOMY: INVASIVE MODERATELY TO POORLY DIFFERENTIATED SQUAMOUS CELL CARCINOMA, KERATINIZING.       -Histologic Grade:  G3: Poorly differentiated   - TUMOR SIZE:  1.9 CM.   - MARGINS NEGATIVE.   - LYMPHOVASCULAR INVASION NOT IDENTIFIED.      -TWO LYMPH NODES NEGATIVE FOR METASTATIC CARCINOMA (0/2).  IMMUNOHISTOCHEMICAL STAIN:  p63: Positive. CK7, TTF-1:  Negative.  Primary Tumor (pT):  pT1b, Regional Lymph Nodes (pN):  pN0     CLINICAL HISTORY:       Patient: 77 year old female kindly referred for lung cancer.  Patient with a 50-pack-year history (used to smoke 3 ppd) but stopped smoking about 30 years ago. She was follow closely for pulmonary nodule.  On 2020 CT scan of the chest showed a right lower lobe 1 cm spiculated nodule, previously 0.8 cm on 2019.  CT was repeated on 3/3/2021 that revealed an enlarging now 2 cm right lower lobe pulmonary nodule concerning for malignancy.    Patient is here today with her .  She denies any fever, chills, sweats.  No chest pain or shortness of breath.  She still have some tenderness in the area of surgery, that is well-healed.  No headaches or changes in vision.  No changes in bowel habits.    Patient have a sister that  of pancreatic cancer.  Brother and father both had cancer that spread to the bones.  No family history of lung cancer or other malignancies.       Chief Complaint: 6 Month Follw Up  (Scan Results.)        Interval History:  Patient presents today for 6 month follow up in surveillance of her lung cancer. She is with her significant other. She reports coughing up phlegm  in the morning. Overall, she says she is feeling good. Denies fever, chills or sweats. No chest pain or shortness of breath. No abnormal bleeding. Quit over 40 years ago.      ROS:  All 14 points ROS taken and positive as per Interval History, all other  "negative.    Histories:  PMH/PSH/FH/SOCIAL/ALLERGIES AND MEDS REVIEWED AND UPDATED AS APPROPRIATE       Vitals:    03/25/24 1121   BP: 116/75   BP Location: Left arm   Patient Position: Sitting   Pulse: 65   Resp: 20   Temp: 98.1 °F (36.7 °C)   TempSrc: Oral   SpO2: 95%   Weight: 73.6 kg (162 lb 4.8 oz)   Height: 5' 2" (1.575 m)          Physical Exam  Vitals and nursing note reviewed.   Constitutional:       General: She is not in acute distress.     Appearance: Normal appearance. She is well-developed.   HENT:      Head: Normocephalic and atraumatic.      Mouth/Throat:      Mouth: Mucous membranes are moist.   Eyes:      General: No scleral icterus.     Extraocular Movements: Extraocular movements intact.      Conjunctiva/sclera: Conjunctivae normal.      Pupils: Pupils are equal, round, and reactive to light.   Neck:      Vascular: No JVD.   Cardiovascular:      Rate and Rhythm: Normal rate and regular rhythm.      Heart sounds: No murmur heard.  Pulmonary:      Effort: Pulmonary effort is normal.      Breath sounds: Normal breath sounds. No wheezing or rhonchi.   Abdominal:      General: Bowel sounds are normal. There is no distension.      Palpations: Abdomen is soft. There is no mass.      Tenderness: There is no abdominal tenderness.   Musculoskeletal:         General: No swelling or deformity.      Cervical back: Neck supple.   Lymphadenopathy:      Cervical: No cervical adenopathy.      Lower Body: No right inguinal adenopathy. No left inguinal adenopathy.   Skin:     General: Skin is warm.      Coloration: Skin is not jaundiced.      Findings: No lesion or rash.      Nails: There is no clubbing.   Neurological:      General: No focal deficit present.      Mental Status: She is alert and oriented to person, place, and time.      Sensory: Sensation is intact.      Motor: Motor function is intact.      Gait: Gait is intact.   Psychiatric:         Attention and Perception: Attention normal.         Mood and " Affect: Mood and affect normal.         Speech: Speech normal.         Behavior: Behavior is cooperative.         Thought Content: Thought content normal.         Cognition and Memory: Cognition normal.         Judgment: Judgment normal.       ECOG SCORE    0 - Fully active-able to carry on all pre-disease performance without restriction         Laboratory:  CBC with Differential:  Lab Results   Component Value Date    WBC 6.98 03/25/2024    RBC 4.70 03/25/2024    HGB 12.2 03/25/2024    HCT 38.7 03/25/2024    MCV 82.3 03/25/2024    MCH 26.0 (L) 03/25/2024    MCHC 31.5 (L) 03/25/2024    RDW 13.0 03/25/2024     03/25/2024    MPV 9.2 03/25/2024        CMP:  Sodium Level   Date Value Ref Range Status   03/25/2024 141 136 - 145 mmol/L Final     Potassium Level   Date Value Ref Range Status   03/25/2024 4.4 3.5 - 5.1 mmol/L Final     Carbon Dioxide   Date Value Ref Range Status   03/25/2024 25 23 - 31 mmol/L Final     Blood Urea Nitrogen   Date Value Ref Range Status   03/25/2024 18.0 9.8 - 20.1 mg/dL Final     Creatinine   Date Value Ref Range Status   03/25/2024 1.14 (H) 0.55 - 1.02 mg/dL Final     Calcium Level Total   Date Value Ref Range Status   03/25/2024 9.2 8.4 - 10.2 mg/dL Final     Albumin Level   Date Value Ref Range Status   03/25/2024 3.4 3.4 - 4.8 g/dL Final     Bilirubin Total   Date Value Ref Range Status   03/25/2024 0.7 <=1.5 mg/dL Final     Alkaline Phosphatase   Date Value Ref Range Status   03/25/2024 70 40 - 150 unit/L Final     Aspartate Aminotransferase   Date Value Ref Range Status   03/25/2024 18 5 - 34 unit/L Final     Alanine Aminotransferase   Date Value Ref Range Status   03/25/2024 13 0 - 55 unit/L Final     Estimated GFR-Non    Date Value Ref Range Status   05/26/2022 50 mls/min/1.73/m2 Final         Assessment:       1. 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        Stage IA2 [id number] RLL NSCLC Dx 4/5/2021   --Mod-poorly differentiated squamous cell  carcinoma   --Tumor 1.9 cm, no lymphovascular invasion identify, visceral pleural invasion not identify, margins clear   --0/7 LN pos  NCCN guidelines surveillance:   --for stage I ([id number])--> margins negative (R0)--> observation (NSCL-4)   --for surveillance after completion of definitive therapy--> H&P and chest CTq6 months x 2-3 yrs, then H&P + low-dose noncontrast enhanced CT annually (NSCL-16)        Plan:       Patient diagnosed with early stage (1A2) non-small cell lung carcinoma, margins negative in April 2021.  No clinical or radiographic evidence of recurrence. Will continue surveillance. She is now 2 years out from her lung cancer diagnosis so we will extend her visit to q 6 months.     RTC 6 months with NP, with same day labs: CBC, CMP  Low dose CT scan due mid Mar. 2025 - will order next visit     Encouraged to call with questions or problems  The patient was given ample opportunity to ask questions and they were all answered to satisfaction; patient demonstrated understanding of what we discussed and is agreeable to plan.    Cielo Aguiar MD  Hematology/Oncology      Professional Services   I, Melissa Judge LPN, acted solely as a scribe for and in the presence of Dr. Cielo Aguiar, who performed these services.

## 2024-04-18 ENCOUNTER — OFFICE VISIT (OUTPATIENT)
Dept: URGENT CARE | Facility: CLINIC | Age: 79
End: 2024-04-18
Payer: MEDICARE

## 2024-04-18 VITALS
OXYGEN SATURATION: 97 % | HEIGHT: 62 IN | WEIGHT: 162 LBS | SYSTOLIC BLOOD PRESSURE: 138 MMHG | RESPIRATION RATE: 18 BRPM | HEART RATE: 82 BPM | TEMPERATURE: 98 F | BODY MASS INDEX: 29.81 KG/M2 | DIASTOLIC BLOOD PRESSURE: 83 MMHG

## 2024-04-18 DIAGNOSIS — N39.0 URINARY TRACT INFECTION WITHOUT HEMATURIA, SITE UNSPECIFIED: ICD-10-CM

## 2024-04-18 DIAGNOSIS — M54.9 BACK PAIN, UNSPECIFIED BACK LOCATION, UNSPECIFIED BACK PAIN LATERALITY, UNSPECIFIED CHRONICITY: Primary | ICD-10-CM

## 2024-04-18 LAB
BILIRUB UR QL STRIP: POSITIVE
GLUCOSE UR QL STRIP: NEGATIVE
KETONES UR QL STRIP: NEGATIVE
LEUKOCYTE ESTERASE UR QL STRIP: POSITIVE
PH, POC UA: 5
POC BLOOD, URINE: NEGATIVE
POC NITRATES, URINE: NEGATIVE
PROT UR QL STRIP: POSITIVE
SP GR UR STRIP: 1.03 (ref 1–1.03)
UROBILINOGEN UR STRIP-ACNC: 1 (ref 0.1–1.1)

## 2024-04-18 PROCEDURE — 87086 URINE CULTURE/COLONY COUNT: CPT | Performed by: FAMILY MEDICINE

## 2024-04-18 PROCEDURE — 99214 OFFICE O/P EST MOD 30 MIN: CPT | Mod: ,,, | Performed by: FAMILY MEDICINE

## 2024-04-18 PROCEDURE — 81003 URINALYSIS AUTO W/O SCOPE: CPT | Mod: QW,,, | Performed by: FAMILY MEDICINE

## 2024-04-18 RX ORDER — SULFAMETHOXAZOLE AND TRIMETHOPRIM 800; 160 MG/1; MG/1
1 TABLET ORAL 2 TIMES DAILY
Qty: 14 TABLET | Refills: 0 | Status: SHIPPED | OUTPATIENT
Start: 2024-04-18 | End: 2024-04-25

## 2024-04-18 RX ORDER — METHOCARBAMOL 500 MG/1
500 TABLET, FILM COATED ORAL 4 TIMES DAILY
Qty: 40 TABLET | Refills: 0 | Status: SHIPPED | OUTPATIENT
Start: 2024-04-18 | End: 2024-04-28

## 2024-04-18 RX ORDER — DICLOFENAC SODIUM 50 MG/1
50 TABLET, DELAYED RELEASE ORAL 2 TIMES DAILY PRN
Qty: 10 TABLET | Refills: 0 | Status: SHIPPED | OUTPATIENT
Start: 2024-04-18 | End: 2024-04-23

## 2024-04-18 NOTE — PROGRESS NOTES
"Subjective:      Patient ID: Amanda Colmenares is a 79 y.o. female.    Vitals:  height is 5' 2" (1.575 m) and weight is 73.5 kg (162 lb). Her temperature is 98.2 °F (36.8 °C). Her blood pressure is 138/83 and her pulse is 82. Her respiration is 18 and oxygen saturation is 97%.     Chief Complaint: Back Pain        79-year-old female presents to clinic complaining of low back pain in the midline intermittently for the past 2 weeks.  Denies any radiation of the pain.  States there have been some days where she had no pain.  Denies any trauma injury or fall.  Denies any history of back issues.  Patient states she does have a history a of kidney stones.  Denies any blood in the urine.  Denies any associated nausea or vomiting.  Denies any bladder or bowel dysfunction.  Denies any weakness or numbness in the bilateral lower extremities.  States she is currently having pain today but it is very mild.  Patient states she can tolerate NSAID        Constitution: Negative.   HENT: Negative.     Cardiovascular: Negative.    Eyes: Negative.    Respiratory: Negative.     Gastrointestinal: Negative.    Genitourinary: Negative.    Musculoskeletal:  Positive for back pain.   Skin: Negative.    Allergic/Immunologic: Negative.    Neurological: Negative.    Hematologic/Lymphatic: Negative.       Objective:     Physical Exam   Constitutional: She is oriented to person, place, and time.  Non-toxic appearance. She does not appear ill. No distress.   HENT:   Head: Normocephalic and atraumatic.   Eyes: Conjunctivae are normal.   Cardiovascular: Normal rate.   Pulmonary/Chest: Effort normal.   Abdominal: Normal appearance. There is no left CVA tenderness and no right CVA tenderness.   Musculoskeletal:         General: Tenderness (mild tenderness to palpation over the lumbar spine.) present.   Neurological: She is alert and oriented to person, place, and time.   Skin: Skin is not diaphoretic and no rash.   Psychiatric: Her behavior is normal. " Mood, judgment and thought content normal.   Vitals reviewed.         Previous History      Review of patient's allergies indicates:   Allergen Reactions    Aspirin-caffeine Other (See Comments)    Morphine Other (See Comments)    Oxycodone-acetaminophen Rash       Past Medical History:   Diagnosis Date    Arthritis     Diabetes mellitus     Hypercholesterolemia     Hypertension      Current Outpatient Medications   Medication Instructions    diclofenac (VOLTAREN) 50 mg, Oral, 2 times daily PRN    enalapril (VASOTEC) 2.5 MG tablet No dose, route, or frequency recorded.    ezetimibe-simvastatin 10-80 mg (VYTORIN) 10-80 mg per tablet No dose, route, or frequency recorded.    glipiZIDE (GLUCOTROL) 10 MG tablet No dose, route, or frequency recorded.    LIDOcaine (LIDODERM) 5 % 1 patch, Transdermal, Daily, Remove & Discard patch within 12 hours or as directed by MD    metFORMIN (GLUCOPHAGE) 1000 MG tablet No dose, route, or frequency recorded.    methocarbamoL (ROBAXIN) 500 mg, Oral, 4 times daily    mirtazapine (REMERON) 15 MG tablet No dose, route, or frequency recorded.    sulfamethoxazole-trimethoprim 800-160mg (BACTRIM DS) 800-160 mg Tab 1 tablet, Oral, 2 times daily    TRUE METRIX GLUCOSE METER kit No dose, route, or frequency recorded.    TRUE METRIX GLUCOSE TEST STRIP Strp No dose, route, or frequency recorded.    TRUEPLUS LANCETS 33 gauge Misc No dose, route, or frequency recorded.    valACYclovir (VALTREX) 1,000 mg, Oral, 3 times daily     Past Surgical History:   Procedure Laterality Date    APPENDECTOMY      COLONOSCOPY      EYE SURGERY      HAND SURGERY      HYSTERECTOMY      KNEE SURGERY Bilateral     TONSILLECTOMY       Family History   Problem Relation Name Age of Onset    Diabetes Mother      Bone cancer Father      Cancer Sister      Heart failure Sister      Cancer Brother         Social History     Tobacco Use    Smoking status: Never    Smokeless tobacco: Never   Substance Use Topics    Alcohol use:  "Never    Drug use: Never        Physical Exam      Vital Signs Reviewed   /83   Pulse 82   Temp 98.2 °F (36.8 °C)   Resp 18   Ht 5' 2" (1.575 m)   Wt 73.5 kg (162 lb)   LMP  (LMP Unknown)   SpO2 97%   BMI 29.63 kg/m²        Procedures    Procedures     Labs     Results for orders placed or performed in visit on 04/18/24   POCT Urinalysis, Dipstick, Automated, W/O Scope   Result Value Ref Range    POC Blood, Urine Negative Negative    POC Bilirubin, Urine Positive (A) Negative    POC Urobilinogen, Urine 1.0 0.1 - 1.1    POC Ketones, Urine Negative Negative    POC Protein, Urine Positive (A) Negative    POC Nitrates, Urine Negative Negative    POC Glucose, Urine Negative Negative    pH, UA 5     POC Specific Gravity, Urine 1.030 (A) 1.003 - 1.029    POC Leukocytes, Urine Positive (A) Negative       Assessment:     1. Back pain, unspecified back location, unspecified back pain laterality, unspecified chronicity    2. Urinary tract infection without hematuria, site unspecified        Plan:   Degenerative changes of the lumbar spine on x-ray.  Medications sent to pharmacy.    Start antibiotics today  Muscle relaxer will cause drowsiness.  Do not drink alcohol or drive when taking it.  Best to take it at bedtime.  If your back pain worsens, seek medical attention immediately  We will culture urine and call you with the results when they become available.  Monitor for fever.  Hydrate.  If your symptoms persist or worsen or you develop fever back pain or belly pain return to clinic or seek medical attention immediately          Back pain, unspecified back location, unspecified back pain laterality, unspecified chronicity  -     POCT Urinalysis, Dipstick, Automated, W/O Scope  -     XR LUMBAR SPINE 2 OR 3 VIEWS; Future; Expected date: 04/18/2024    Urinary tract infection without hematuria, site unspecified  -     Urine culture    Other orders  -     sulfamethoxazole-trimethoprim 800-160mg (BACTRIM DS) 800-160 " mg Tab; Take 1 tablet by mouth 2 (two) times daily. for 7 days  Dispense: 14 tablet; Refill: 0  -     methocarbamoL (ROBAXIN) 500 MG Tab; Take 1 tablet (500 mg total) by mouth 4 (four) times daily. for 10 days  Dispense: 40 tablet; Refill: 0  -     diclofenac (VOLTAREN) 50 MG EC tablet; Take 1 tablet (50 mg total) by mouth 2 (two) times daily as needed (pain).  Dispense: 10 tablet; Refill: 0

## 2024-04-18 NOTE — PATIENT INSTRUCTIONS
Plan:   Degenerative changes of the lumbar spine on x-ray.  Medications sent to pharmacy.    Start antibiotics today  Muscle relaxer will cause drowsiness.  Do not drink alcohol or drive when taking it.  Best to take it at bedtime.  If your back pain worsens, seek medical attention immediately  We will culture urine and call you with the results when they become available.  Monitor for fever.  Hydrate.  If your symptoms persist or worsen or you develop fever back pain or belly pain return to clinic or seek medical attention immediately

## 2024-04-19 NOTE — PROGRESS NOTES
Preliminary urine culture with Gram-negative dixie findings, plan to continue and finish Bactrim antibiotic coverage for urinary tract infection.  Recommend follow-up with primary care physician in the next week for urinary tract infection re-evaluation.  Pending final urine culture for any further care plan changes or prescription antibiotic medication change.

## 2024-04-20 LAB — BACTERIA UR CULT: ABNORMAL

## 2024-07-25 ENCOUNTER — HOSPITAL ENCOUNTER (EMERGENCY)
Facility: HOSPITAL | Age: 79
Discharge: HOME | End: 2024-07-25
Attending: EMERGENCY MEDICINE
Payer: MEDICARE

## 2024-07-25 ENCOUNTER — OFFICE VISIT (OUTPATIENT)
Dept: URGENT CARE | Facility: CLINIC | Age: 79
End: 2024-07-25
Payer: MEDICARE

## 2024-07-25 VITALS
DIASTOLIC BLOOD PRESSURE: 77 MMHG | SYSTOLIC BLOOD PRESSURE: 157 MMHG | HEIGHT: 62 IN | HEART RATE: 64 BPM | BODY MASS INDEX: 29.81 KG/M2 | RESPIRATION RATE: 18 BRPM | OXYGEN SATURATION: 100 % | TEMPERATURE: 98 F | WEIGHT: 162 LBS

## 2024-07-25 VITALS
HEART RATE: 71 BPM | BODY MASS INDEX: 27.6 KG/M2 | WEIGHT: 150 LBS | TEMPERATURE: 98 F | RESPIRATION RATE: 20 BRPM | HEIGHT: 62 IN | OXYGEN SATURATION: 98 % | DIASTOLIC BLOOD PRESSURE: 70 MMHG | SYSTOLIC BLOOD PRESSURE: 139 MMHG

## 2024-07-25 DIAGNOSIS — R11.2 NAUSEA AND VOMITING, UNSPECIFIED VOMITING TYPE: ICD-10-CM

## 2024-07-25 DIAGNOSIS — R42 DIZZINESS: Primary | ICD-10-CM

## 2024-07-25 DIAGNOSIS — R53.1 WEAKNESS: ICD-10-CM

## 2024-07-25 DIAGNOSIS — R11.10 VOMITING, UNSPECIFIED VOMITING TYPE, UNSPECIFIED WHETHER NAUSEA PRESENT: ICD-10-CM

## 2024-07-25 LAB
ALBUMIN SERPL-MCNC: 3.7 G/DL (ref 3.4–4.8)
ALBUMIN/GLOB SERPL: 1.1 RATIO (ref 1.1–2)
ALP SERPL-CCNC: 59 UNIT/L (ref 40–150)
ALT SERPL-CCNC: 16 UNIT/L (ref 0–55)
ANION GAP SERPL CALC-SCNC: 9 MEQ/L
AST SERPL-CCNC: 22 UNIT/L (ref 5–34)
BACTERIA #/AREA URNS AUTO: ABNORMAL /HPF
BASOPHILS # BLD AUTO: 0.06 X10(3)/MCL
BASOPHILS NFR BLD AUTO: 0.5 %
BILIRUB SERPL-MCNC: 0.8 MG/DL
BILIRUB UR QL STRIP.AUTO: NEGATIVE
BUN SERPL-MCNC: 20.4 MG/DL (ref 9.8–20.1)
CALCIUM SERPL-MCNC: 9.5 MG/DL (ref 8.4–10.2)
CHLORIDE SERPL-SCNC: 104 MMOL/L (ref 98–107)
CLARITY UR: CLEAR
CO2 SERPL-SCNC: 28 MMOL/L (ref 23–31)
COLOR UR AUTO: YELLOW
CREAT SERPL-MCNC: 1.21 MG/DL (ref 0.55–1.02)
CREAT/UREA NIT SERPL: 17
EKG 12-LEAD: NORMAL
EOSINOPHIL # BLD AUTO: 0.02 X10(3)/MCL (ref 0–0.9)
EOSINOPHIL NFR BLD AUTO: 0.2 %
ERYTHROCYTE [DISTWIDTH] IN BLOOD BY AUTOMATED COUNT: 13.6 % (ref 11.5–17)
FLUAV AG UPPER RESP QL IA.RAPID: NOT DETECTED
FLUBV AG UPPER RESP QL IA.RAPID: NOT DETECTED
GFR SERPLBLD CREATININE-BSD FMLA CKD-EPI: 46 ML/MIN/1.73/M2
GLOBULIN SER-MCNC: 3.5 GM/DL (ref 2.4–3.5)
GLUCOSE SERPL-MCNC: 120 MG/DL (ref 70–110)
GLUCOSE SERPL-MCNC: 152 MG/DL (ref 82–115)
GLUCOSE UR QL STRIP: NORMAL
HCT VFR BLD AUTO: 38.5 % (ref 37–47)
HGB BLD-MCNC: 12.1 G/DL (ref 12–16)
HGB UR QL STRIP: NEGATIVE
HYALINE CASTS #/AREA URNS LPF: ABNORMAL /LPF
IMM GRANULOCYTES # BLD AUTO: 0.08 X10(3)/MCL (ref 0–0.04)
IMM GRANULOCYTES NFR BLD AUTO: 0.7 %
KETONES UR QL STRIP: NEGATIVE
LEUKOCYTE ESTERASE UR QL STRIP: NEGATIVE
LIPASE SERPL-CCNC: 30 U/L
LYMPHOCYTES # BLD AUTO: 1.09 X10(3)/MCL (ref 0.6–4.6)
LYMPHOCYTES NFR BLD AUTO: 9 %
MCH RBC QN AUTO: 25.8 PG (ref 27–31)
MCHC RBC AUTO-ENTMCNC: 31.4 G/DL (ref 33–36)
MCV RBC AUTO: 82.1 FL (ref 80–94)
MONOCYTES # BLD AUTO: 0.33 X10(3)/MCL (ref 0.1–1.3)
MONOCYTES NFR BLD AUTO: 2.7 %
MUCOUS THREADS URNS QL MICRO: ABNORMAL /LPF
NEUTROPHILS # BLD AUTO: 10.59 X10(3)/MCL (ref 2.1–9.2)
NEUTROPHILS NFR BLD AUTO: 86.9 %
NITRITE UR QL STRIP: NEGATIVE
NRBC BLD AUTO-RTO: 0 %
OHS QRS DURATION: 90 MS
OHS QTC CALCULATION: 438 MS
PH UR STRIP: 5.5 [PH]
PLATELET # BLD AUTO: 223 X10(3)/MCL (ref 130–400)
PMV BLD AUTO: 10.2 FL (ref 7.4–10.4)
POTASSIUM SERPL-SCNC: 5.3 MMOL/L (ref 3.5–5.1)
PR INTERVAL: NORMAL
PROT SERPL-MCNC: 7.2 GM/DL (ref 5.8–7.6)
PROT UR QL STRIP: ABNORMAL
PRT AXES: NORMAL
QRS DURATION: NORMAL
QT/QTC: NORMAL
RBC # BLD AUTO: 4.69 X10(6)/MCL (ref 4.2–5.4)
RBC #/AREA URNS AUTO: ABNORMAL /HPF
SARS-COV-2 RNA RESP QL NAA+PROBE: NOT DETECTED
SODIUM SERPL-SCNC: 141 MMOL/L (ref 136–145)
SP GR UR STRIP.AUTO: 1.02 (ref 1–1.03)
SQUAMOUS #/AREA URNS LPF: ABNORMAL /HPF
TROPONIN I SERPL-MCNC: <0.01 NG/ML (ref 0–0.04)
UROBILINOGEN UR STRIP-ACNC: NORMAL
VENTRICULAR RATE: NORMAL
WBC # BLD AUTO: 12.17 X10(3)/MCL (ref 4.5–11.5)
WBC #/AREA URNS AUTO: ABNORMAL /HPF

## 2024-07-25 PROCEDURE — 83690 ASSAY OF LIPASE: CPT | Performed by: NURSE PRACTITIONER

## 2024-07-25 PROCEDURE — 0240U COVID/FLU A&B PCR: CPT | Performed by: NURSE PRACTITIONER

## 2024-07-25 PROCEDURE — 93010 ELECTROCARDIOGRAM REPORT: CPT | Mod: ,,, | Performed by: INTERNAL MEDICINE

## 2024-07-25 PROCEDURE — 63600175 PHARM REV CODE 636 W HCPCS: Performed by: NURSE PRACTITIONER

## 2024-07-25 PROCEDURE — 99285 EMERGENCY DEPT VISIT HI MDM: CPT | Mod: 25

## 2024-07-25 PROCEDURE — 80053 COMPREHEN METABOLIC PANEL: CPT | Performed by: NURSE PRACTITIONER

## 2024-07-25 PROCEDURE — 84484 ASSAY OF TROPONIN QUANT: CPT | Performed by: NURSE PRACTITIONER

## 2024-07-25 PROCEDURE — 96374 THER/PROPH/DIAG INJ IV PUSH: CPT

## 2024-07-25 PROCEDURE — 85025 COMPLETE CBC W/AUTO DIFF WBC: CPT | Performed by: NURSE PRACTITIONER

## 2024-07-25 PROCEDURE — 93005 ELECTROCARDIOGRAM TRACING: CPT

## 2024-07-25 PROCEDURE — 81001 URINALYSIS AUTO W/SCOPE: CPT | Performed by: NURSE PRACTITIONER

## 2024-07-25 RX ORDER — MECLIZINE HYDROCHLORIDE 25 MG/1
25 TABLET ORAL 3 TIMES DAILY PRN
Qty: 20 TABLET | Refills: 0 | Status: SHIPPED | OUTPATIENT
Start: 2024-07-25

## 2024-07-25 RX ORDER — ONDANSETRON 2 MG/ML
8 INJECTION INTRAMUSCULAR; INTRAVENOUS
Status: COMPLETED | OUTPATIENT
Start: 2024-07-25 | End: 2024-07-25

## 2024-07-25 RX ORDER — ASPIRIN 325 MG
325 TABLET ORAL DAILY
COMMUNITY

## 2024-07-25 RX ORDER — ONDANSETRON HYDROCHLORIDE 2 MG/ML
4 INJECTION, SOLUTION INTRAVENOUS
Status: COMPLETED | OUTPATIENT
Start: 2024-07-25 | End: 2024-07-25

## 2024-07-25 RX ORDER — CALCIUM CARBONATE 600 MG
600 TABLET ORAL ONCE
COMMUNITY

## 2024-07-25 RX ADMIN — ONDANSETRON 4 MG: 2 INJECTION INTRAMUSCULAR; INTRAVENOUS at 03:07

## 2024-07-25 RX ADMIN — ONDANSETRON 4 MG: 2 INJECTION INTRAMUSCULAR; INTRAVENOUS at 01:07

## 2024-07-25 NOTE — ED PROVIDER NOTES
Encounter Date: 7/25/2024    SCRIBE #1 NOTE: I, Zunilda Garciai, am scribing for, and in the presence of,  Asuncion Langford DO. I have scribed the following portions of the note - Other sections scribed: HPI, ROS, PE.       History     Chief Complaint   Patient presents with    Dizziness     pT. C/O DIZZINESS AND VOMITING STARTED THIS MORNING .. REPORTS COMING FROM WALK IN CLINIC DID EKG.. HX OF DM..      Patient is a 79 year old female with a hx of arthritis, DM, hypercholesterolemia, and HTN presents to the ED for dizziness beginning this morning. Patient reports she started feeling like she was going to pass out and the room was spinning. She reports nausea and vomiting. She denies pain or vision changes. Patient reports she is feeling better in room. Her  reports she just stopped throwing up.     Patient's PCP is Dr. Lowery in [place].  Patient's Cardiologist is Dr. Ortiz.    The history is provided by the patient and medical records. No  was used.     Review of patient's allergies indicates:   Allergen Reactions    Aspirin-caffeine Other (See Comments)    Morphine Other (See Comments)    Oxycodone-acetaminophen Rash     Past Medical History:   Diagnosis Date    Arthritis     Diabetes mellitus     Hypercholesterolemia     Hypertension      Past Surgical History:   Procedure Laterality Date    APPENDECTOMY      COLONOSCOPY      EYE SURGERY      HAND SURGERY      HYSTERECTOMY      KNEE SURGERY Bilateral     TONSILLECTOMY       Family History   Problem Relation Name Age of Onset    Diabetes Mother      Bone cancer Father      Cancer Sister      Heart failure Sister      Cancer Brother       Social History     Tobacco Use    Smoking status: Never    Smokeless tobacco: Never   Substance Use Topics    Alcohol use: Never    Drug use: Never     Review of Systems   Eyes:  Negative for visual disturbance.   Gastrointestinal:  Positive for nausea and vomiting.   Neurological:  Positive for dizziness and  syncope (near).       Physical Exam     Initial Vitals [07/25/24 1511]   BP Pulse Resp Temp SpO2   (!) 165/77 70 18 98.1 °F (36.7 °C) 96 %      MAP       --         Physical Exam    Nursing note and vitals reviewed.  Constitutional: She appears well-developed and well-nourished. She is not diaphoretic. She does not appear ill. No distress.   HENT:   Head: Normocephalic and atraumatic.   Right Ear: External ear normal.   Left Ear: External ear normal.   Mouth/Throat: Oropharynx is clear and moist.   Eyes: Conjunctivae and EOM are normal. Pupils are equal, round, and reactive to light.   Neck: Neck supple. No tracheal deviation present.   Cardiovascular:  Normal rate, regular rhythm and normal heart sounds.           No murmur heard.  Pulmonary/Chest: Breath sounds normal. No respiratory distress. She has no wheezes. She has no rhonchi. She has no rales.   Abdominal: Abdomen is soft. She exhibits no distension. There is no abdominal tenderness.   No right CVA tenderness.  No left CVA tenderness.   Musculoskeletal:         General: Normal range of motion.      Cervical back: Neck supple.     Neurological: She is alert and oriented to person, place, and time. She has normal strength. No cranial nerve deficit. GCS score is 15. GCS eye subscore is 4. GCS verbal subscore is 5. GCS motor subscore is 6.   Slow on finger-to-nose exam bilaterally but no true dysmetria    Skin: Skin is warm and dry. Capillary refill takes less than 2 seconds. No rash noted. No pallor.         ED Course   Procedures  Labs Reviewed   COMPREHENSIVE METABOLIC PANEL - Abnormal       Result Value    Sodium 141      Potassium 5.3 (*)     Chloride 104      CO2 28      Glucose 152 (*)     Blood Urea Nitrogen 20.4 (*)     Creatinine 1.21 (*)     Calcium 9.5      Protein Total 7.2      Albumin 3.7      Globulin 3.5      Albumin/Globulin Ratio 1.1      Bilirubin Total 0.8      ALP 59      ALT 16      AST 22      eGFR 46      Anion Gap 9.0       BUN/Creatinine Ratio 17     CBC WITH DIFFERENTIAL - Abnormal    WBC 12.17 (*)     RBC 4.69      Hgb 12.1      Hct 38.5      MCV 82.1      MCH 25.8 (*)     MCHC 31.4 (*)     RDW 13.6      Platelet 223      MPV 10.2      Neut % 86.9      Lymph % 9.0      Mono % 2.7      Eos % 0.2      Basophil % 0.5      Lymph # 1.09      Neut # 10.59 (*)     Mono # 0.33      Eos # 0.02      Baso # 0.06      IG# 0.08 (*)     IG% 0.7      NRBC% 0.0     URINALYSIS, REFLEX TO URINE CULTURE - Abnormal    Color, UA Yellow      Appearance, UA Clear      Specific Gravity, UA 1.024      pH, UA 5.5      Protein, UA 1+ (*)     Glucose, UA Normal      Ketones, UA Negative      Blood, UA Negative      Bilirubin, UA Negative      Urobilinogen, UA Normal      Nitrites, UA Negative      Leukocyte Esterase, UA Negative      RBC, UA 0-5      WBC, UA 0-5      Bacteria, UA None Seen      Squamous Epithelial Cells, UA Trace      Mucous, UA Trace (*)     Hyaline Casts, UA 0-2 (*)    LIPASE - Normal    Lipase Level 30     COVID/FLU A&B PCR - Normal    Influenza A PCR Not Detected      Influenza B PCR Not Detected      SARS-CoV-2 PCR Not Detected      Narrative:     The Xpert Xpress SARS-CoV-2/FLU/RSV plus is a rapid, multiplexed real-time PCR test intended for the simultaneous qualitative detection and differentiation of SARS-CoV-2, Influenza A, Influenza B, and respiratory syncytial virus (RSV) viral RNA in either nasopharyngeal swab or nasal swab specimens.         TROPONIN I - Normal    Troponin-I <0.010     CBC W/ AUTO DIFFERENTIAL    Narrative:     The following orders were created for panel order CBC Auto Differential.  Procedure                               Abnormality         Status                     ---------                               -----------         ------                     CBC with Differential[[id number]]       Abnormal            Final result                 Please view results for these tests on the individual orders.     EKG  Readings: (Independently Interpreted)   Initial Reading: No STEMI. Rhythm: Normal Sinus Rhythm. Heart Rate: 72. Ectopy: No Ectopy. Conduction: Normal. ST Segments: Normal ST Segments. T Waves: Normal. Axis: Normal.   Performed at 1513     ECG Results              EKG 12-lead (Final result)        Collection Time Result Time QRS Duration OHS QTC Calculation    07/25/24 15:13:40 07/25/24 16:30:30 90 438                     Final result by Interface, Lab In [facility] (07/25/24 16:30:38)                   Narrative:    Test Reason : R07.9,    Vent. Rate : 072 BPM     Atrial Rate : 072 BPM     P-R Int : 152 ms          QRS Dur : 090 ms      QT Int : 400 ms       P-R-T Axes : -13 000 043 degrees     QTc Int : 438 ms    Normal sinus rhythm  Normal ECG  No previous ECGs available  Confirmed by Zafar Fallon MD ([id number]) on 7/25/2024 4:30:29 PM    Referred By:             Confirmed By:Zafar Fallon MD                                  Imaging Results              MRI Brain Without Contrast (Preliminary result)  Result time 07/25/24 21:44:54      Preliminary result by Jerry Cruz Jr., MD (07/25/24 21:44:54)                   Narrative:    START OF REPORT:  Technique: Multiplanar, multisequence magnetic resonance imaging of the brain was performed without intravenous contrast.    Comparison: Correlation is with CT study dated 2024-07-25 18:29:33.    Clinical history: Tia, diz.    Findings:  Hemorrhage: No acute intracranial hemorrhage is identified.  Stroke: No abnormal signal is identified on the diffusion images to suggest acute infarct.  Extra axial spaces: The ventricles and sulci and basal cisterns all appear severely prominent consistent with global cerebral atrophy.  Cerebellopontine angles: Normal.  Cerebral, cerebellar, and brainstem parenchyma: Minimal periventricular white matter signal abnormalities are seen. The main consideration is small vessel ischemic changes in a patient of this age.  Herniation:  None.  Calvarium: Within normal limits.  Vascular system: Normal flow voids.  Visualized paranasal sinuses: Normal.  Visualized orbits: Both native orbital lenses are absent; otherwise unremarkable orbits.  Visualized upper cervical spine: Normal.  Sella and skull base: Normal.  Temporal bones and mastoids: Within normal limits.      Impression:  1. No acute intracranial process is identified. Details and findings as above.                                         CT Head Without Contrast (Final result)  Result time 07/25/24 18:30:56      Final result by Jose Rhodes MD (07/25/24 18:30:56)                   Impression:      1.  No acute intracranial findings identified.    2.  Chronic microangiopathic ischemia and atrophy.      Electronically signed by: Jose Rhodes  Date:    07/25/2024  Time:    18:30               Narrative:    EXAMINATION:  CT HEAD WITHOUT CONTRAST    CLINICAL HISTORY:  Dizziness, persistent/recurrent, cardiac or vascular cause suspected;    TECHNIQUE:  Sequential axial images were performed of the brain without contrast.    Dose product length of 976 mGycm. Automated exposure control was utilized to minimize radiation dose.    COMPARISON:  CT brain March 4, 2015.    FINDINGS:  There is no intracranial mass effect, midline shift, hydrocephalus or hemorrhage. There is no sulcal effacement or low attenuation changes to suggest recent large vessel territory infarction. Chronic appearing periventricular and subcortical white matter low attenuation changes are present and are consistent with mild chronic microangiopathic ischemia. The ventricular system and sulcal markings prominence is consistent with prominent atrophy.  There is no acute extra axial fluid collection. Visualized paranasal sinuses are clear without mucosal thickening, polypoidal abnormality or air-fluid levels.  There is chronic under pneumatization of the right mastoid air cells.                                       Medications    ondansetron injection 4 mg (4 mg Intravenous Given 7/25/24 0579)     Medical Decision Making  The differential diagnosis includes, but is not limited to, stroke, anemia, tumor, seizure, vertigo, dehydration      CT head normal  Labs unremarkable  MRI normal      Problems Addressed:  Dizziness: acute illness or injury that poses a threat to life or bodily functions    Amount and/or Complexity of Data Reviewed  Independent Historian: spouse     Details: Her  reports she just stopped throwing up.   Labs: ordered. Decision-making details documented in ED Course.  Radiology: ordered. Decision-making details documented in ED Course.  ECG/medicine tests: ordered and independent interpretation performed. Decision-making details documented in ED Course.    Risk  Prescription drug management.            Scribe Attestation:   Scribe #1: I performed the above scribed service and the documentation accurately describes the services I performed. I attest to the accuracy of the note.    Attending Attestation:           Physician Attestation for Scribe:  Physician Attestation Statement for Scribe #1: I, Asuncion Langford, DO, reviewed documentation, as scribed by Zunilda Torrez in my presence, and it is both accurate and complete.             ED Course as of 07/25/24 2217   Thu Jul 25, 2024   1854 Patient had episode of nausea/vomiting after CT scan, will proceed with MRI  [KM]   2214 MRI is normal, symptoms have resolved. Will discharge patient. Discussed possible vertigo   [KM]      ED Course User Index  [KM] Asuncion Langford MD                           Clinical Impression:  Final diagnoses:  [R42] Dizziness (Primary)          ED Disposition Condition    Discharge Stable          ED Prescriptions       Medication Sig Dispense Start Date End Date Auth. Provider    meclizine (ANTIVERT) 25 mg tablet Take 1 tablet (25 mg total) by mouth 3 (three) times daily as needed for Dizziness. 20 tablet 7/25/2024 -- Asuncion Langford MD           Follow-up Information       Follow up With Specialties Details Why Contact Info    Vishal Lowery MD Family Medicine Schedule an appointment as soon as possible for a visit   [address] [facility] [address]  [phone]      [facility] - Emergency Dept Emergency Medicine  As needed, If symptoms worsen [address] [facility] [id number] [phone]             Asuncion Langford MD  07/25/24 1550

## 2024-07-25 NOTE — FIRST PROVIDER EVALUATION
Medical screening examination initiated.  I have conducted a focused provider triage encounter, findings are as follows:    Brief history of present illness:  Patient states dizziness, nausea, and vomiting.     There were no vitals filed for this visit.    Pertinent physical exam:  Awake, alert, ambulatory      Brief workup plan:  Labs, EKG    Preliminary workup initiated; this workup will be continued and followed by the physician or advanced practice provider that is assigned to the patient when roomed.

## 2024-07-25 NOTE — PATIENT INSTRUCTIONS
As discussed, it is recommended that you present to the ER now for further evaluation to prevent a delay in care.   Offered transport via EMS but patient/family declines despite informed risks including death.  Opts for private transport via personal vehicle.

## 2024-07-25 NOTE — PROGRESS NOTES
"Subjective:      Patient ID: Amanda Colmenares is a 79 y.o. female.    Vitals:  height is 5' 2" (1.575 m) and weight is 73.5 kg (162 lb). Her temperature is 97.7 °F (36.5 °C). Her blood pressure is 157/77 (abnormal) and her pulse is 64. Her respiration is 18 and oxygen saturation is 100%.     Chief Complaint: Dizziness     Patient is a 79 y.o. female with past medical history of diabetes, hypertension, hyperlipidemia who presents to urgent care with complaints of dizziness and vomiting.  Awakening this morning with sensation of dizziness.  Vomiting began at noon while lying down.  Vomiting appears as bile at this time.  When questioned about the dizziness and if she feels sensation of the room spinning, she is unable to fully describe and reports it feels as though she is going to pass out and has generalized weakness.    She is currently Vomiting in exam room during physical exam.  Once vomiting episode is over patient does report some sensation of "room spinning" dizziness.  However she denies any significant change with lying down, change in position of her head from side-to-side.  She does report some increase in dizziness when going from lying to sitting.  She denies chest pain, shortness breath, fever, body aches, cough, congestion, abdominal pain, urinary symptoms, recent injury or trauma, fall, hitting head.  Denies any numbness, tingling, focal weakness, speech changes.  Spouse reports she has never had a history of vertigo in the past.  Blood sugar this morning was 77, was able to eat after checking her sugar.  Her balance is slightly off on walking to from exam room table to chair.    ROS          Previous History      Review of patient's allergies indicates:   Allergen Reactions    Aspirin-caffeine Other (See Comments)    Morphine Other (See Comments)    Oxycodone-acetaminophen Rash       Past Medical History:   Diagnosis Date    Arthritis     Diabetes mellitus     Hypercholesterolemia     Hypertension  " "    Current Outpatient Medications   Medication Instructions    enalapril (VASOTEC) 2.5 MG tablet No dose, route, or frequency recorded.    ezetimibe-simvastatin 10-80 mg (VYTORIN) 10-80 mg per tablet No dose, route, or frequency recorded.    glipiZIDE (GLUCOTROL) 10 MG tablet No dose, route, or frequency recorded.    LIDOcaine (LIDODERM) 5 % 1 patch, Transdermal, Daily, Remove & Discard patch within 12 hours or as directed by MD    metFORMIN (GLUCOPHAGE) 1000 MG tablet No dose, route, or frequency recorded.    mirtazapine (REMERON) 15 MG tablet No dose, route, or frequency recorded.    TRUE METRIX GLUCOSE METER kit No dose, route, or frequency recorded.    TRUE METRIX GLUCOSE TEST STRIP Strp No dose, route, or frequency recorded.    TRUEPLUS LANCETS 33 gauge Misc No dose, route, or frequency recorded.    valACYclovir (VALTREX) 1,000 mg, Oral, 3 times daily     Past Surgical History:   Procedure Laterality Date    APPENDECTOMY      COLONOSCOPY      EYE SURGERY      HAND SURGERY      HYSTERECTOMY      KNEE SURGERY Bilateral     TONSILLECTOMY       Family History   Problem Relation Name Age of Onset    Diabetes Mother      Bone cancer Father      Cancer Sister      Heart failure Sister      Cancer Brother         Social History     Tobacco Use    Smoking status: Never    Smokeless tobacco: Never   Substance Use Topics    Alcohol use: Never    Drug use: Never        Physical Exam      Vital Signs Reviewed   BP (!) 157/77   Pulse 64   Temp 97.7 °F (36.5 °C)   Resp 18   Ht 5' 2" (1.575 m)   Wt 73.5 kg (162 lb)   LMP  (LMP Unknown)   SpO2 100%   BMI 29.63 kg/m²        Procedures    Procedures     Labs     Results for orders placed or performed in visit on 07/25/24   POCT Glucose, Hand-Held Device   Result Value Ref Range    POC Glucose 120 (A) 70 - 110 MG/DL   POCT EKG 12-LEAD (NOT FOR [facility] USE)   Result Value Ref Range    EKG 12-Lead      Ventricular Rate      CO Interval      QRS Duration      QT/QTc      PRT " Axes         Objective:     Physical Exam   Constitutional: She is oriented to person, place, and time. She appears well-developed. She appears ill. No distress.   HENT:   Head: Normocephalic and atraumatic.   Ears:   Right Ear: External ear normal.   Left Ear: External ear normal.   Nose: Nose normal.   Mouth/Throat: Mucous membranes are normal.   Eyes: Conjunctivae and lids are normal.   Neck: Trachea normal. Neck supple.   Cardiovascular: Normal rate, regular rhythm and normal heart sounds.   Pulmonary/Chest: Effort normal and breath sounds normal. No respiratory distress.   Abdominal: Normal appearance and bowel sounds are normal. She exhibits no distension and no mass. Soft. There is no abdominal tenderness.   Musculoskeletal: Normal range of motion.         General: Normal range of motion.   Neurological: no focal deficit. She is alert, oriented to person, place, and time and at baseline. She has normal strength. She displays no weakness and facial symmetry. No sensory deficit.      Comments: Attempted Cromwell-Hallpike maneuver, patient denies any change or increase in any dizziness symptoms with lying flat, head position changes   Skin: Skin is warm, dry, intact and not pale.   Psychiatric: Her speech is normal and behavior is normal. Judgment and thought content normal.   Nursing note and vitals reviewed.  EKG sinus rhythm, low QRS voltage in precordial leads, normal variant of EKG, heart rate 67, no ST elevation    Assessment:     1. Dizziness    2. Nausea and vomiting, unspecified vomiting type    3. Weakness    4. Vomiting, unspecified vomiting type, unspecified whether nausea present        Plan:       Dizziness  -     POCT EKG 12-LEAD (NOT FOR [facility] USE)  -     POCT Glucose, Hand-Held Device  -     Refer to Emergency Dept.    Nausea and vomiting, unspecified vomiting type  -     ondansetron injection 8 mg  -     Refer to Emergency Dept.    Weakness  -     POCT EKG 12-LEAD (NOT FOR [facility]  USE)    Vomiting, unspecified vomiting type, unspecified whether nausea present  -     POCT Glucose, Hand-Held Device        Zofran 4 mg injection given in clinic, EKG performed.   in clinic.     Discussed possibility of symptoms being related to new onset vertigo however based on age, no previous diagnosis of vertigo,suggested she presents to the ER for further workup, blood work, testing.   Based on age, symptoms discussed presenting to the ER for further workup.  Offered ambulance transport however she and has been declined.    As discussed, it is recommended that you present to the ER now for further evaluation to prevent a delay in care.   Offered transport via EMS but patient/family declines despite informed risks including death.  Opts for private transport via personal vehicle.

## 2024-09-25 ENCOUNTER — OFFICE VISIT (OUTPATIENT)
Dept: HEMATOLOGY/ONCOLOGY | Facility: CLINIC | Age: 79
End: 2024-09-25
Payer: MEDICARE

## 2024-09-25 ENCOUNTER — LAB VISIT (OUTPATIENT)
Dept: LAB | Facility: HOSPITAL | Age: 79
End: 2024-09-25
Attending: INTERNAL MEDICINE
Payer: MEDICARE

## 2024-09-25 VITALS
WEIGHT: 159.63 LBS | RESPIRATION RATE: 18 BRPM | SYSTOLIC BLOOD PRESSURE: 119 MMHG | OXYGEN SATURATION: 98 % | DIASTOLIC BLOOD PRESSURE: 69 MMHG | HEART RATE: 62 BPM | BODY MASS INDEX: 29.38 KG/M2 | TEMPERATURE: 98 F | HEIGHT: 62 IN

## 2024-09-25 DIAGNOSIS — C34.31 SQUAMOUS CELL CARCINOMA OF BRONCHUS IN RIGHT LOWER LOBE: ICD-10-CM

## 2024-09-25 DIAGNOSIS — C34.90 MALIGNANT NEOPLASM OF UNSPECIFIED PART OF UNSPECIFIED BRONCHUS OR LUNG: Primary | ICD-10-CM

## 2024-09-25 LAB
ALBUMIN SERPL-MCNC: 3.7 G/DL (ref 3.4–4.8)
ALBUMIN/GLOB SERPL: 1.2 RATIO (ref 1.1–2)
ALP SERPL-CCNC: 65 UNIT/L (ref 40–150)
ALT SERPL-CCNC: 12 UNIT/L (ref 0–55)
ANION GAP SERPL CALC-SCNC: 9 MEQ/L
AST SERPL-CCNC: 19 UNIT/L (ref 5–34)
BASOPHILS # BLD AUTO: 0.04 X10(3)/MCL
BASOPHILS NFR BLD AUTO: 0.5 %
BILIRUB SERPL-MCNC: 0.7 MG/DL
BUN SERPL-MCNC: 20.2 MG/DL (ref 9.8–20.1)
CALCIUM SERPL-MCNC: 9.1 MG/DL (ref 8.4–10.2)
CHLORIDE SERPL-SCNC: 105 MMOL/L (ref 98–107)
CO2 SERPL-SCNC: 28 MMOL/L (ref 23–31)
CREAT SERPL-MCNC: 1.41 MG/DL (ref 0.55–1.02)
CREAT/UREA NIT SERPL: 14
EOSINOPHIL # BLD AUTO: 0.31 X10(3)/MCL (ref 0–0.9)
EOSINOPHIL NFR BLD AUTO: 4 %
ERYTHROCYTE [DISTWIDTH] IN BLOOD BY AUTOMATED COUNT: 13.1 % (ref 11.5–17)
GFR SERPLBLD CREATININE-BSD FMLA CKD-EPI: 38 ML/MIN/1.73/M2
GLOBULIN SER-MCNC: 3.1 GM/DL (ref 2.4–3.5)
GLUCOSE SERPL-MCNC: 178 MG/DL (ref 82–115)
HCT VFR BLD AUTO: 40.1 % (ref 37–47)
HGB BLD-MCNC: 12.5 G/DL (ref 12–16)
IMM GRANULOCYTES # BLD AUTO: 0.02 X10(3)/MCL (ref 0–0.04)
IMM GRANULOCYTES NFR BLD AUTO: 0.3 %
LYMPHOCYTES # BLD AUTO: 1.83 X10(3)/MCL (ref 0.6–4.6)
LYMPHOCYTES NFR BLD AUTO: 23.4 %
MCH RBC QN AUTO: 26 PG (ref 27–31)
MCHC RBC AUTO-ENTMCNC: 31.2 G/DL (ref 33–36)
MCV RBC AUTO: 83.5 FL (ref 80–94)
MONOCYTES # BLD AUTO: 0.64 X10(3)/MCL (ref 0.1–1.3)
MONOCYTES NFR BLD AUTO: 8.2 %
NEUTROPHILS # BLD AUTO: 4.98 X10(3)/MCL (ref 2.1–9.2)
NEUTROPHILS NFR BLD AUTO: 63.6 %
PLATELET # BLD AUTO: 206 X10(3)/MCL (ref 130–400)
PMV BLD AUTO: 9.7 FL (ref 7.4–10.4)
POTASSIUM SERPL-SCNC: 4.8 MMOL/L (ref 3.5–5.1)
PROT SERPL-MCNC: 6.8 GM/DL (ref 5.8–7.6)
RBC # BLD AUTO: 4.8 X10(6)/MCL (ref 4.2–5.4)
SODIUM SERPL-SCNC: 142 MMOL/L (ref 136–145)
WBC # BLD AUTO: 7.82 X10(3)/MCL (ref 4.5–11.5)

## 2024-09-25 PROCEDURE — 99214 OFFICE O/P EST MOD 30 MIN: CPT | Mod: S$PBB,,, | Performed by: NURSE PRACTITIONER

## 2024-09-25 PROCEDURE — 80053 COMPREHEN METABOLIC PANEL: CPT

## 2024-09-25 PROCEDURE — 85025 COMPLETE CBC W/AUTO DIFF WBC: CPT

## 2024-09-25 PROCEDURE — 99999 PR PBB SHADOW E&M-EST. PATIENT-LVL IV: CPT | Mod: PBBFAC,,, | Performed by: NURSE PRACTITIONER

## 2024-09-25 PROCEDURE — 36415 COLL VENOUS BLD VENIPUNCTURE: CPT

## 2024-09-25 NOTE — PROGRESS NOTES
HEMATOLOGY/ONCOLOGY OFFICE CLINIC VISIT    Visit Information:  Dx & staging   Initial Consultation: 2021  Referring Physician: Dr Darling  Other Physicians: Dr Zayas  Code Status: Not addressed    Diagnosis/Problem list:   Stage IA2 [id number] RLL NSCLC. Dx 21    Present Treatment:  Observation    Treatment history:    2021 underwent right lower lobectomy with mediastinal lymph node    Plan of care: Surveillance      Imagin2019 Ct Chest:  RLL superior segment 7 mm groundglass density is identified. A RLL12 mm x 7.9 mm mass is noted with marginal spiculation.  2019 PET/CT: Right lower lobe 8 mm nodule is stable in size and demonstrates only low-grade FDG activity. A follow-up chest CT in 3-6 months would be reasonable to monitor.  2020 CT scan chest: showed a right lower lobe 1 cm spiculated nodule, previously 0.8 cm   3/3/2021 CT Chest: enlarging now 2 cm right lower lobe pulmonary nodule concerning for malignancy.      2022 CT CHEST: Impression: 1. Postoperative changes of right lower lobe nodule resection are again seen.  2. There is a 4.5 mm ground-glass nodule in the anterior left upper lobe which appears grossly stable compared to previous CT       dated 2022.  Recommend continued close surveillance with CT chest in 6 months.  3. Additional findings and details as above.       CT Chest: No acute intrathoracic process.  No evidence of malignancy to the chest.  Surgical changes consistent with partial lung resection on the right.  Stable left subcentimeter adrenal nodule.      3/18/2024 CT Chest:  No detrimental change identified since 2023.     Pathology:  2021: right lower lobectomy with mediastinal lymph node dissection   [1]  LYMPH NODE, 9R, LYMPHADENECTOMY: THREE LYMPH NODE NEGATIVE FOR METASTATIC CARCINOMA (0/3).  [2]  LYMPH NODE, 11R, LYMPHADENECTOMY: ONE LYMPH NODE NEGATIVE FOR METASTATIC CARCINOMA (0/1).  [3]  LYMPH NODE, 4R, LYMPHADENECTOMY:  ONE LYMPH NODE NEGATIVE FOR METASTATIC CARCINOMA (0/1).  [4]  LUNG, RIGHT LOWER LOBECTOMY: INVASIVE MODERATELY TO POORLY DIFFERENTIATED SQUAMOUS CELL CARCINOMA, KERATINIZING.       -Histologic Grade:  G3: Poorly differentiated   - TUMOR SIZE:  1.9 CM.   - MARGINS NEGATIVE.   - LYMPHOVASCULAR INVASION NOT IDENTIFIED.      -TWO LYMPH NODES NEGATIVE FOR METASTATIC CARCINOMA (0/2).  IMMUNOHISTOCHEMICAL STAIN:  p63: Positive. CK7, TTF-1:  Negative.  Primary Tumor (pT):  pT1b, Regional Lymph Nodes (pN):  pN0     CLINICAL HISTORY:       Patient: 77 year old female kindly referred for lung cancer.  Patient with a 50-pack-year history (used to smoke 3 ppd) but stopped smoking about 30 years ago. She was follow closely for pulmonary nodule.  On 2020 CT scan of the chest showed a right lower lobe 1 cm spiculated nodule, previously 0.8 cm on 2019.  CT was repeated on 3/3/2021 that revealed an enlarging now 2 cm right lower lobe pulmonary nodule concerning for malignancy.    Patient is here today with her .  She denies any fever, chills, sweats.  No chest pain or shortness of breath.  She still have some tenderness in the area of surgery, that is well-healed.  No headaches or changes in vision.  No changes in bowel habits.    Patient have a sister that  of pancreatic cancer.  Brother and father both had cancer that spread to the bones.  No family history of lung cancer or other malignancies.       Chief Complaint: 6 Month Follow Up        Interval History:  Patient presents today for 6 month follow up in surveillance of her lung cancer. She is with her significant other. She reports coughing up phlegm  in the morning. Overall, she says she is feeling good. Denies fever, chills or sweats. No chest pain or shortness of breath. No abnormal bleeding. Quit over 40 years ago.      ROS:  All 14 points ROS taken and positive as per Interval History, all other negative.    Histories:  PMH/PSH/FH/SOCIAL/ALLERGIES AND  "MEDS REVIEWED AND UPDATED AS APPROPRIATE       Vitals:    09/25/24 0938   BP: 119/69   BP Location: Left arm   Patient Position: Sitting   Pulse: 62   Resp: 18   Temp: 97.8 °F (36.6 °C)   TempSrc: Oral   SpO2: 98%   Weight: 72.4 kg (159 lb 9.6 oz)   Height: 5' 2" (1.575 m)          Physical Exam  Vitals and nursing note reviewed.   Constitutional:       General: She is not in acute distress.     Appearance: Normal appearance. She is well-developed.   HENT:      Head: Normocephalic and atraumatic.      Mouth/Throat:      Mouth: Mucous membranes are moist.   Eyes:      General: No scleral icterus.     Extraocular Movements: Extraocular movements intact.      Conjunctiva/sclera: Conjunctivae normal.      Pupils: Pupils are equal, round, and reactive to light.   Neck:      Vascular: No JVD.   Cardiovascular:      Rate and Rhythm: Normal rate and regular rhythm.      Heart sounds: No murmur heard.  Pulmonary:      Effort: Pulmonary effort is normal.      Breath sounds: Normal breath sounds. No wheezing or rhonchi.   Abdominal:      General: Bowel sounds are normal. There is no distension.      Palpations: Abdomen is soft. There is no mass.      Tenderness: There is no abdominal tenderness.   Musculoskeletal:         General: No swelling or deformity.      Cervical back: Neck supple.   Lymphadenopathy:      Cervical: No cervical adenopathy.      Lower Body: No right inguinal adenopathy. No left inguinal adenopathy.   Skin:     General: Skin is warm.      Coloration: Skin is not jaundiced.      Findings: No lesion or rash.      Nails: There is no clubbing.   Neurological:      General: No focal deficit present.      Mental Status: She is alert and oriented to person, place, and time.      Sensory: Sensation is intact.      Motor: Motor function is intact.      Gait: Gait is intact.   Psychiatric:         Attention and Perception: Attention normal.         Mood and Affect: Mood and affect normal.         Speech: Speech " normal.         Behavior: Behavior is cooperative.         Thought Content: Thought content normal.         Cognition and Memory: Cognition normal.         Judgment: Judgment normal.       ECOG SCORE             Laboratory:  CBC with Differential:  Lab Results   Component Value Date    WBC 7.82 09/25/2024    RBC 4.80 09/25/2024    HGB 12.5 09/25/2024    HCT 40.1 09/25/2024    MCV 83.5 09/25/2024    MCH 26.0 (L) 09/25/2024    MCHC 31.2 (L) 09/25/2024    RDW 13.1 09/25/2024     09/25/2024    MPV 9.7 09/25/2024        CMP:  Sodium   Date Value Ref Range Status   07/25/2024 141 136 - 145 mmol/L Final     Potassium   Date Value Ref Range Status   07/25/2024 5.3 (H) 3.5 - 5.1 mmol/L Final     Chloride   Date Value Ref Range Status   07/25/2024 104 98 - 107 mmol/L Final     CO2   Date Value Ref Range Status   07/25/2024 28 23 - 31 mmol/L Final     Blood Urea Nitrogen   Date Value Ref Range Status   07/25/2024 20.4 (H) 9.8 - 20.1 mg/dL Final     Creatinine   Date Value Ref Range Status   07/25/2024 1.21 (H) 0.55 - 1.02 mg/dL Final     Calcium   Date Value Ref Range Status   07/25/2024 9.5 8.4 - 10.2 mg/dL Final     Albumin   Date Value Ref Range Status   07/25/2024 3.7 3.4 - 4.8 g/dL Final     Bilirubin Total   Date Value Ref Range Status   07/25/2024 0.8 <=1.5 mg/dL Final     ALP   Date Value Ref Range Status   07/25/2024 59 40 - 150 unit/L Final     AST   Date Value Ref Range Status   07/25/2024 22 5 - 34 unit/L Final     ALT   Date Value Ref Range Status   07/25/2024 16 0 - 55 unit/L Final     Estimated GFR-Non    Date Value Ref Range Status   05/26/2022 50 mls/min/1.73/m2 Final         Assessment:       1. Malignant neoplasm of unspecified part of unspecified bronchus or lung    2. 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          Stage IA2 [id number] RLL NSCLC Dx 4/5/2021   --Mod-poorly differentiated squamous cell carcinoma   --Tumor 1.9 cm, no lymphovascular invasion identify,  visceral pleural invasion not identify, margins clear   --0/7 LN pos  NCCN guidelines surveillance:   --for stage I ([id number])--> margins negative (R0)--> observation (NSCL-4)   --for surveillance after completion of definitive therapy--> H&P and chest CTq6 months x 2-3 yrs, then H&P + low-dose noncontrast enhanced CT annually (NSCL-16)        Plan:       Patient diagnosed with early stage (1A2) non-small cell lung carcinoma, margins negative in April 2021.  No clinical or radiographic evidence of recurrence. Will continue surveillance. She is over 2 years out from her lung cancer diagnosis so we will extend her visit to q 6 months.         Will continue surveillance.   Low dose CT scan due mid Mar. 2025 - Ordered.   RTC 6 months with same day labs: CBC, CMP- with MD for results.      Encouraged to call with questions or problems  The patient was given ample opportunity to ask questions and they were all answered to satisfaction; patient demonstrated understanding of what we discussed and is agreeable to plan.      ANTONIO Bean

## 2025-04-02 ENCOUNTER — HOSPITAL ENCOUNTER (OUTPATIENT)
Dept: RADIOLOGY | Facility: HOSPITAL | Age: 80
Discharge: HOME | End: 2025-04-02
Attending: NURSE PRACTITIONER
Payer: MEDICARE

## 2025-04-02 DIAGNOSIS — C34.90 MALIGNANT NEOPLASM OF UNSPECIFIED PART OF UNSPECIFIED BRONCHUS OR LUNG: ICD-10-CM

## 2025-04-02 LAB
CREAT SERPL-MCNC: 1.4 MG/DL (ref 0.5–1.4)
SAMPLE: NORMAL

## 2025-04-02 PROCEDURE — 71260 CT THORAX DX C+: CPT | Mod: TC

## 2025-04-02 PROCEDURE — 25500020 PHARM REV CODE 255: Performed by: NURSE PRACTITIONER

## 2025-04-02 RX ADMIN — IOHEXOL 75 ML: 350 INJECTION, SOLUTION INTRAVENOUS at 02:04

## 2025-04-09 ENCOUNTER — LAB VISIT (OUTPATIENT)
Dept: LAB | Facility: HOSPITAL | Age: 80
End: 2025-04-09
Attending: NURSE PRACTITIONER
Payer: MEDICARE

## 2025-04-09 ENCOUNTER — OFFICE VISIT (OUTPATIENT)
Dept: HEMATOLOGY/ONCOLOGY | Facility: CLINIC | Age: 80
End: 2025-04-09
Payer: MEDICARE

## 2025-04-09 VITALS
SYSTOLIC BLOOD PRESSURE: 96 MMHG | WEIGHT: 160.19 LBS | HEART RATE: 75 BPM | BODY MASS INDEX: 29.48 KG/M2 | OXYGEN SATURATION: 100 % | DIASTOLIC BLOOD PRESSURE: 56 MMHG | HEIGHT: 62 IN

## 2025-04-09 DIAGNOSIS — C34.90 MALIGNANT NEOPLASM OF UNSPECIFIED PART OF UNSPECIFIED BRONCHUS OR LUNG: ICD-10-CM

## 2025-04-09 DIAGNOSIS — C34.31 SQUAMOUS CELL CARCINOMA OF BRONCHUS IN RIGHT LOWER LOBE: ICD-10-CM

## 2025-04-09 LAB
ALBUMIN SERPL-MCNC: 3.7 G/DL (ref 3.4–4.8)
ALBUMIN/GLOB SERPL: 1 RATIO (ref 1.1–2)
ALP SERPL-CCNC: 58 UNIT/L (ref 40–150)
ALT SERPL-CCNC: 15 UNIT/L (ref 0–55)
ANION GAP SERPL CALC-SCNC: 10 MEQ/L
AST SERPL-CCNC: 22 UNIT/L (ref 11–45)
BASOPHILS # BLD AUTO: 0.06 X10(3)/MCL
BASOPHILS NFR BLD AUTO: 0.7 %
BILIRUB SERPL-MCNC: 0.7 MG/DL
BUN SERPL-MCNC: 22.5 MG/DL (ref 9.8–20.1)
CALCIUM SERPL-MCNC: 9.3 MG/DL (ref 8.4–10.2)
CHLORIDE SERPL-SCNC: 108 MMOL/L (ref 98–107)
CO2 SERPL-SCNC: 25 MMOL/L (ref 23–31)
CREAT SERPL-MCNC: 1.25 MG/DL (ref 0.55–1.02)
CREAT/UREA NIT SERPL: 18
EOSINOPHIL # BLD AUTO: 0.24 X10(3)/MCL (ref 0–0.9)
EOSINOPHIL NFR BLD AUTO: 2.8 %
ERYTHROCYTE [DISTWIDTH] IN BLOOD BY AUTOMATED COUNT: 13.2 % (ref 11.5–17)
GFR SERPLBLD CREATININE-BSD FMLA CKD-EPI: 44 ML/MIN/1.73/M2
GLOBULIN SER-MCNC: 3.7 GM/DL (ref 2.4–3.5)
GLUCOSE SERPL-MCNC: 65 MG/DL (ref 82–115)
HCT VFR BLD AUTO: 41.5 % (ref 37–47)
HGB BLD-MCNC: 13.1 G/DL (ref 12–16)
IMM GRANULOCYTES # BLD AUTO: 0.05 X10(3)/MCL (ref 0–0.04)
IMM GRANULOCYTES NFR BLD AUTO: 0.6 %
LYMPHOCYTES # BLD AUTO: 2.29 X10(3)/MCL (ref 0.6–4.6)
LYMPHOCYTES NFR BLD AUTO: 26.5 %
MCH RBC QN AUTO: 26.5 PG (ref 27–31)
MCHC RBC AUTO-ENTMCNC: 31.6 G/DL (ref 33–36)
MCV RBC AUTO: 84 FL (ref 80–94)
MONOCYTES # BLD AUTO: 0.81 X10(3)/MCL (ref 0.1–1.3)
MONOCYTES NFR BLD AUTO: 9.4 %
NEUTROPHILS # BLD AUTO: 5.19 X10(3)/MCL (ref 2.1–9.2)
NEUTROPHILS NFR BLD AUTO: 60 %
PLATELET # BLD AUTO: 228 X10(3)/MCL (ref 130–400)
PMV BLD AUTO: 9.3 FL (ref 7.4–10.4)
POTASSIUM SERPL-SCNC: 4.7 MMOL/L (ref 3.5–5.1)
PROT SERPL-MCNC: 7.4 GM/DL (ref 5.8–7.6)
RBC # BLD AUTO: 4.94 X10(6)/MCL (ref 4.2–5.4)
SODIUM SERPL-SCNC: 143 MMOL/L (ref 136–145)
WBC # BLD AUTO: 8.64 X10(3)/MCL (ref 4.5–11.5)

## 2025-04-09 PROCEDURE — 85025 COMPLETE CBC W/AUTO DIFF WBC: CPT

## 2025-04-09 PROCEDURE — 36415 COLL VENOUS BLD VENIPUNCTURE: CPT

## 2025-04-09 PROCEDURE — 80053 COMPREHEN METABOLIC PANEL: CPT

## 2025-04-09 PROCEDURE — 99999 PR PBB SHADOW E&M-EST. PATIENT-LVL IV: CPT | Mod: PBBFAC,,, | Performed by: NURSE PRACTITIONER

## 2025-04-09 NOTE — PROGRESS NOTES
HEMATOLOGY/ONCOLOGY OFFICE CLINIC VISIT    Visit Information:  Dx & staging   Initial Consultation: 2021  Referring Physician: Dr Darling  Other Physicians: Dr Zayas  Code Status: Not addressed    Diagnosis/Problem list:   Stage IA2 [id number] RLL NSCLC. Dx 21    Present Treatment:  Observation    Treatment history:    2021 underwent right lower lobectomy with mediastinal lymph node    Plan of care: Surveillance      Imagin2019 Ct Chest:  RLL superior segment 7 mm groundglass density is identified. A RLL12 mm x 7.9 mm mass is noted with marginal spiculation.  2019 PET/CT: Right lower lobe 8 mm nodule is stable in size and demonstrates only low-grade FDG activity. A follow-up chest CT in 3-6 months would be reasonable to monitor.  2020 CT scan chest: showed a right lower lobe 1 cm spiculated nodule, previously 0.8 cm   3/3/2021 CT Chest: enlarging now 2 cm right lower lobe pulmonary nodule concerning for malignancy.      2022 CT CHEST: Impression: 1. Postoperative changes of right lower lobe nodule resection are again seen.  2. There is a 4.5 mm ground-glass nodule in the anterior left upper lobe which appears grossly stable compared to previous CT       dated 2022.  Recommend continued close surveillance with CT chest in 6 months.  3. Additional findings and details as above.       CT Chest: No acute intrathoracic process.  No evidence of malignancy to the chest.  Surgical changes consistent with partial lung resection on the right.  Stable left subcentimeter adrenal nodule.      3/18/2024 CT Chest:  No detrimental change identified since 2023.     Pathology:  2021: right lower lobectomy with mediastinal lymph node dissection   [1]  LYMPH NODE, 9R, LYMPHADENECTOMY: THREE LYMPH NODE NEGATIVE FOR METASTATIC CARCINOMA (0/3).  [2]  LYMPH NODE, 11R, LYMPHADENECTOMY: ONE LYMPH NODE NEGATIVE FOR METASTATIC CARCINOMA (0/1).  [3]  LYMPH NODE, 4R, LYMPHADENECTOMY:  ONE LYMPH NODE NEGATIVE FOR METASTATIC CARCINOMA (0/1).  [4]  LUNG, RIGHT LOWER LOBECTOMY: INVASIVE MODERATELY TO POORLY DIFFERENTIATED SQUAMOUS CELL CARCINOMA, KERATINIZING.       -Histologic Grade:  G3: Poorly differentiated   - TUMOR SIZE:  1.9 CM.   - MARGINS NEGATIVE.   - LYMPHOVASCULAR INVASION NOT IDENTIFIED.      -TWO LYMPH NODES NEGATIVE FOR METASTATIC CARCINOMA (0/2).  IMMUNOHISTOCHEMICAL STAIN:  p63: Positive. CK7, TTF-1:  Negative.  Primary Tumor (pT):  pT1b, Regional Lymph Nodes (pN):  pN0     CLINICAL HISTORY:       Patient: 77 year old female kindly referred for lung cancer.  Patient with a 50-pack-year history (used to smoke 3 ppd) but stopped smoking about 30 years ago. She was follow closely for pulmonary nodule.  On 2020 CT scan of the chest showed a right lower lobe 1 cm spiculated nodule, previously 0.8 cm on 2019.  CT was repeated on 3/3/2021 that revealed an enlarging now 2 cm right lower lobe pulmonary nodule concerning for malignancy.    Patient is here today with her .  She denies any fever, chills, sweats.  No chest pain or shortness of breath.  She still have some tenderness in the area of surgery, that is well-healed.  No headaches or changes in vision.  No changes in bowel habits.    Patient have a sister that  of pancreatic cancer.  Brother and father both had cancer that spread to the bones.  No family history of lung cancer or other malignancies.       Chief Complaint: Lung Cancer (6 month follow up)        Interval History:  Amanda Colmenares presents for follow-up visit. No evidence of disease on recent imaging.  Patient reports feeling well with no new health concerns.  No shortness of breath, chest pain, or difficulty swallowing reported.      ROS:  All 14 points ROS taken and positive as per Interval History, all other negative.    Histories:  PMH/PSH/FH/SOCIAL/ALLERGIES AND MEDS REVIEWED AND UPDATED AS APPROPRIATE       There were no vitals filed for this  visit.         Physical Exam  Vitals and nursing note reviewed.   Constitutional:       General: She is not in acute distress.     Appearance: Normal appearance. She is well-developed.   HENT:      Head: Normocephalic and atraumatic.      Mouth/Throat:      Mouth: Mucous membranes are moist.   Eyes:      General: No scleral icterus.     Extraocular Movements: Extraocular movements intact.      Conjunctiva/sclera: Conjunctivae normal.      Pupils: Pupils are equal, round, and reactive to light.   Neck:      Vascular: No JVD.   Cardiovascular:      Rate and Rhythm: Normal rate and regular rhythm.      Heart sounds: No murmur heard.  Pulmonary:      Effort: Pulmonary effort is normal.      Breath sounds: Normal breath sounds. No wheezing or rhonchi.   Abdominal:      General: Bowel sounds are normal. There is no distension.      Palpations: Abdomen is soft. There is no mass.      Tenderness: There is no abdominal tenderness.   Musculoskeletal:         General: No swelling or deformity.      Cervical back: Neck supple.   Lymphadenopathy:      Cervical: No cervical adenopathy.      Lower Body: No right inguinal adenopathy. No left inguinal adenopathy.   Skin:     General: Skin is warm.      Coloration: Skin is not jaundiced.      Findings: No lesion or rash.      Nails: There is no clubbing.   Neurological:      General: No focal deficit present.      Mental Status: She is alert and oriented to person, place, and time.      Sensory: Sensation is intact.      Motor: Motor function is intact.      Gait: Gait is intact.   Psychiatric:         Attention and Perception: Attention normal.         Mood and Affect: Mood and affect normal.         Speech: Speech normal.         Behavior: Behavior is cooperative.         Thought Content: Thought content normal.         Cognition and Memory: Cognition normal.         Judgment: Judgment normal.     ECOG SCORE             Laboratory:  CBC with Differential:  Lab Results   Component  Value Date    WBC 8.64 04/09/2025    RBC 4.94 04/09/2025    HGB 13.1 04/09/2025    HCT 41.5 04/09/2025    MCV 84.0 04/09/2025    MCH 26.5 (L) 04/09/2025    MCHC 31.6 (L) 04/09/2025    RDW 13.2 04/09/2025     04/09/2025    MPV 9.3 04/09/2025        CMP:  Sodium   Date Value Ref Range Status   09/25/2024 142 136 - 145 mmol/L Final     Potassium   Date Value Ref Range Status   09/25/2024 4.8 3.5 - 5.1 mmol/L Final     Chloride   Date Value Ref Range Status   09/25/2024 105 98 - 107 mmol/L Final     CO2   Date Value Ref Range Status   09/25/2024 28 23 - 31 mmol/L Final     Blood Urea Nitrogen   Date Value Ref Range Status   09/25/2024 20.2 (H) 9.8 - 20.1 mg/dL Final     Creatinine   Date Value Ref Range Status   09/25/2024 1.41 (H) 0.55 - 1.02 mg/dL Final     Calcium   Date Value Ref Range Status   09/25/2024 9.1 8.4 - 10.2 mg/dL Final     Albumin   Date Value Ref Range Status   09/25/2024 3.7 3.4 - 4.8 g/dL Final     Bilirubin Total   Date Value Ref Range Status   09/25/2024 0.7 <=1.5 mg/dL Final     ALP   Date Value Ref Range Status   09/25/2024 65 40 - 150 unit/L Final     AST   Date Value Ref Range Status   09/25/2024 19 5 - 34 unit/L Final     ALT   Date Value Ref Range Status   09/25/2024 12 0 - 55 unit/L Final     Estimated GFR-Non    Date Value Ref Range Status   05/26/2022 50 mls/min/1.73/m2 Final         Assessment:       No diagnosis found.        Stage IA2 [id number] RLL NSCLC Dx 4/5/2021   --Mod-poorly differentiated squamous cell carcinoma   --Tumor 1.9 cm, no lymphovascular invasion identify, visceral pleural invasion not identify, margins clear   --0/7 LN pos  NCCN guidelines surveillance:   --for stage I ([id number])--> margins negative (R0)--> observation (NSCL-4)   --for surveillance after completion of definitive therapy--> H&P and chest CTq6 months x 2-3 yrs, then H&P + low-dose noncontrast enhanced [place] annually [facility] [place]        Plan:       Dx: stage (1A2) non-small cell lung  carcinoma, margins negative in April 2021.  Status: No evidence of disease  Negative CAT scan.o clinical or radiographic evidence of recurrence  Labs unremarkable.   Lung exam normal.   Patient clinically doing well.   Follow-up appointment in 6 months with labs and CT    Patient instructions and visit orders.       -Follow-up:  F/U with NP 6 month  -Labs:  CBC, CMP CEA- 6 month  -Imaging:  CT Chest With Contrast - PTA  -Other orders:      32 were spent on this encounter    Mamadou Rabago, MSN, FNP-BC, APRN, AOCNP   Department of Hematology/Oncology.